# Patient Record
Sex: FEMALE | Race: WHITE | NOT HISPANIC OR LATINO | Employment: OTHER | URBAN - METROPOLITAN AREA
[De-identification: names, ages, dates, MRNs, and addresses within clinical notes are randomized per-mention and may not be internally consistent; named-entity substitution may affect disease eponyms.]

---

## 2021-09-27 ENCOUNTER — HOSPITAL ENCOUNTER (OUTPATIENT)
Dept: LAB | Facility: MEDICAL CENTER | Age: 61
End: 2021-09-27
Attending: FAMILY MEDICINE
Payer: COMMERCIAL

## 2021-09-27 LAB
25(OH)D3 SERPL-MCNC: 58 NG/ML (ref 30–100)
ALBUMIN SERPL BCP-MCNC: 4.5 G/DL (ref 3.2–4.9)
ALBUMIN/GLOB SERPL: 1.5 G/DL
ALP SERPL-CCNC: 66 U/L (ref 30–99)
ALT SERPL-CCNC: 12 U/L (ref 2–50)
ANION GAP SERPL CALC-SCNC: 10 MMOL/L (ref 7–16)
AST SERPL-CCNC: 17 U/L (ref 12–45)
BASOPHILS # BLD AUTO: 1.3 % (ref 0–1.8)
BASOPHILS # BLD: 0.06 K/UL (ref 0–0.12)
BILIRUB SERPL-MCNC: 0.4 MG/DL (ref 0.1–1.5)
BUN SERPL-MCNC: 8 MG/DL (ref 8–22)
CA-I SERPL-SCNC: 1.3 MMOL/L (ref 1.1–1.3)
CALCIUM SERPL-MCNC: 10.1 MG/DL (ref 8.5–10.5)
CHLORIDE SERPL-SCNC: 112 MMOL/L (ref 96–112)
CHOLEST SERPL-MCNC: 167 MG/DL (ref 100–199)
CO2 SERPL-SCNC: 24 MMOL/L (ref 20–33)
CREAT SERPL-MCNC: 0.64 MG/DL (ref 0.5–1.4)
CRP SERPL HS-MCNC: 0.2 MG/L (ref 0–3)
DHEA-S SERPL-MCNC: 163 UG/DL (ref 18.9–205)
EOSINOPHIL # BLD AUTO: 0.45 K/UL (ref 0–0.51)
EOSINOPHIL NFR BLD: 9.7 % (ref 0–6.9)
ERYTHROCYTE [DISTWIDTH] IN BLOOD BY AUTOMATED COUNT: 39.6 FL (ref 35.9–50)
EST. AVERAGE GLUCOSE BLD GHB EST-MCNC: 100 MG/DL
ESTRADIOL SERPL-MCNC: 17.1 PG/ML
GLOBULIN SER CALC-MCNC: 3 G/DL (ref 1.9–3.5)
GLUCOSE SERPL-MCNC: 95 MG/DL (ref 65–99)
HBA1C MFR BLD: 5.1 % (ref 4–5.6)
HCT VFR BLD AUTO: 42.6 % (ref 37–47)
HCYS SERPL-SCNC: 9.47 UMOL/L
HDLC SERPL-MCNC: 80 MG/DL
HGB BLD-MCNC: 14.6 G/DL (ref 12–16)
IMM GRANULOCYTES # BLD AUTO: 0 K/UL (ref 0–0.11)
IMM GRANULOCYTES NFR BLD AUTO: 0 % (ref 0–0.9)
LDLC SERPL CALC-MCNC: 75 MG/DL
LYMPHOCYTES # BLD AUTO: 1.25 K/UL (ref 1–4.8)
LYMPHOCYTES NFR BLD: 26.9 % (ref 22–41)
MCH RBC QN AUTO: 31.1 PG (ref 27–33)
MCHC RBC AUTO-ENTMCNC: 34.3 G/DL (ref 33.6–35)
MCV RBC AUTO: 90.8 FL (ref 81.4–97.8)
MONOCYTES # BLD AUTO: 0.29 K/UL (ref 0–0.85)
MONOCYTES NFR BLD AUTO: 6.2 % (ref 0–13.4)
NEUTROPHILS # BLD AUTO: 2.6 K/UL (ref 2–7.15)
NEUTROPHILS NFR BLD: 55.9 % (ref 44–72)
NRBC # BLD AUTO: 0 K/UL
NRBC BLD-RTO: 0 /100 WBC
PLATELET # BLD AUTO: 246 K/UL (ref 164–446)
PMV BLD AUTO: 10 FL (ref 9–12.9)
POTASSIUM SERPL-SCNC: 4.2 MMOL/L (ref 3.6–5.5)
PROGEST SERPL-MCNC: 0.85 NG/ML
PROLACTIN SERPL-MCNC: 12.1 NG/ML (ref 2.8–26)
PROT SERPL-MCNC: 7.5 G/DL (ref 6–8.2)
PTH-INTACT SERPL-MCNC: 67.4 PG/ML (ref 14–72)
RBC # BLD AUTO: 4.69 M/UL (ref 4.2–5.4)
SODIUM SERPL-SCNC: 146 MMOL/L (ref 135–145)
T3FREE SERPL-MCNC: 3.07 PG/ML (ref 2–4.4)
THYROPEROXIDASE AB SERPL-ACNC: <9 IU/ML (ref 0–9)
TRIGL SERPL-MCNC: 58 MG/DL (ref 0–149)
TSH SERPL DL<=0.005 MIU/L-ACNC: 1.29 UIU/ML (ref 0.38–5.33)
WBC # BLD AUTO: 4.7 K/UL (ref 4.8–10.8)

## 2021-09-27 PROCEDURE — 84305 ASSAY OF SOMATOMEDIN: CPT

## 2021-09-27 PROCEDURE — 84443 ASSAY THYROID STIM HORMONE: CPT

## 2021-09-27 PROCEDURE — 82679 ASSAY OF ESTRONE: CPT

## 2021-09-27 PROCEDURE — 84146 ASSAY OF PROLACTIN: CPT

## 2021-09-27 PROCEDURE — 83525 ASSAY OF INSULIN: CPT

## 2021-09-27 PROCEDURE — 84144 ASSAY OF PROGESTERONE: CPT

## 2021-09-27 PROCEDURE — 36415 COLL VENOUS BLD VENIPUNCTURE: CPT

## 2021-09-27 PROCEDURE — 86141 C-REACTIVE PROTEIN HS: CPT

## 2021-09-27 PROCEDURE — 84140 ASSAY OF PREGNENOLONE: CPT

## 2021-09-27 PROCEDURE — 80061 LIPID PANEL: CPT

## 2021-09-27 PROCEDURE — 80053 COMPREHEN METABOLIC PANEL: CPT

## 2021-09-27 PROCEDURE — 84481 FREE ASSAY (FT-3): CPT

## 2021-09-27 PROCEDURE — 84270 ASSAY OF SEX HORMONE GLOBUL: CPT

## 2021-09-27 PROCEDURE — 86376 MICROSOMAL ANTIBODY EACH: CPT

## 2021-09-27 PROCEDURE — 85025 COMPLETE CBC W/AUTO DIFF WBC: CPT

## 2021-09-27 PROCEDURE — 84403 ASSAY OF TOTAL TESTOSTERONE: CPT

## 2021-09-27 PROCEDURE — 83036 HEMOGLOBIN GLYCOSYLATED A1C: CPT

## 2021-09-27 PROCEDURE — 83090 ASSAY OF HOMOCYSTEINE: CPT

## 2021-09-27 PROCEDURE — 82670 ASSAY OF TOTAL ESTRADIOL: CPT

## 2021-09-27 PROCEDURE — 82306 VITAMIN D 25 HYDROXY: CPT

## 2021-09-27 PROCEDURE — 82627 DEHYDROEPIANDROSTERONE: CPT

## 2021-09-27 PROCEDURE — 82330 ASSAY OF CALCIUM: CPT

## 2021-09-27 PROCEDURE — 84402 ASSAY OF FREE TESTOSTERONE: CPT

## 2021-09-27 PROCEDURE — 86800 THYROGLOBULIN ANTIBODY: CPT

## 2021-09-27 PROCEDURE — 83970 ASSAY OF PARATHORMONE: CPT

## 2021-09-27 PROCEDURE — 84482 T3 REVERSE: CPT

## 2021-09-29 LAB
INSULIN P FAST SERPL-ACNC: 6 UIU/ML (ref 3–25)
THYROGLOB AB SERPL-ACNC: <0.9 IU/ML (ref 0–4)

## 2021-09-30 LAB
IGF-I SERPL-MCNC: 126 NG/ML (ref 41–243)
IGF-I Z-SCORE SERPL: 0.2
PREG SERPL-MCNC: 87 NG/DL (ref 15–132)

## 2021-10-01 LAB
ESTRONE SERPL-MCNC: 25.9 PG/ML
T3REVERSE SERPL-MCNC: 12.8 NG/DL (ref 9–27)

## 2021-10-02 LAB
SHBG SERPL-SCNC: 99 NMOL/L (ref 30–135)
TESTOST FREE SERPL-MCNC: 1.4 PG/ML (ref 0.6–3.8)
TESTOST SERPL-MCNC: 18 NG/DL (ref 5–32)

## 2021-12-13 ENCOUNTER — OFFICE VISIT (OUTPATIENT)
Dept: URGENT CARE | Facility: CLINIC | Age: 61
End: 2021-12-13
Payer: COMMERCIAL

## 2021-12-13 VITALS
OXYGEN SATURATION: 95 % | DIASTOLIC BLOOD PRESSURE: 66 MMHG | WEIGHT: 153.6 LBS | SYSTOLIC BLOOD PRESSURE: 110 MMHG | TEMPERATURE: 97.6 F | HEART RATE: 64 BPM | BODY MASS INDEX: 24.11 KG/M2 | HEIGHT: 67 IN | RESPIRATION RATE: 16 BRPM

## 2021-12-13 DIAGNOSIS — R51.9 CHRONIC NONINTRACTABLE HEADACHE, UNSPECIFIED HEADACHE TYPE: ICD-10-CM

## 2021-12-13 DIAGNOSIS — G89.29 CHRONIC NONINTRACTABLE HEADACHE, UNSPECIFIED HEADACHE TYPE: ICD-10-CM

## 2021-12-13 PROCEDURE — 99203 OFFICE O/P NEW LOW 30 MIN: CPT | Performed by: PHYSICIAN ASSISTANT

## 2021-12-13 RX ORDER — KETOROLAC TROMETHAMINE 30 MG/ML
30 INJECTION, SOLUTION INTRAMUSCULAR; INTRAVENOUS ONCE
Status: DISCONTINUED | OUTPATIENT
Start: 2021-12-13 | End: 2021-12-13

## 2021-12-13 RX ORDER — PROGESTERONE 100 MG/1
100 CAPSULE ORAL
COMMUNITY

## 2021-12-13 RX ORDER — CYCLOBENZAPRINE HCL 10 MG
10 TABLET ORAL NIGHTLY PRN
Qty: 15 TABLET | Refills: 0 | Status: SHIPPED | OUTPATIENT
Start: 2021-12-13 | End: 2022-02-15

## 2021-12-13 ASSESSMENT — FIBROSIS 4 INDEX: FIB4 SCORE: 1.22

## 2021-12-14 NOTE — PROGRESS NOTES
"Subjective:   Fela Montoya is a 61 y.o. female who presents for Headache (Headaches x 2 months. The patient went to the chiropractor and PCP, blood pressure was normal per patient. H/O sinu problems/migraines, but not like this before. Weather affects symptoms. Tx: Advil, Tylenol.)      HPI  Patient is a 61-year-old female who presents to clinic with complaints of a headache onset approximately 6 weeks ago.  The headaches are intermittent.  Sometimes they are mild and sometimes they are very painful she states.  Coughing makes the headache worse.  Headache described as a pressure.  Location to the posterior head and neck.  Denies any sinus pain/sinus headache.  She has been to the chiropractor twice without much relief.  She also presented to her PCP a couple days ago and  she states her PCP only evaluated her blood pressure and prescribed her a migraine medication.  She is unsure which medication because she has not picked it up yet.  Currently, her headache is 3-4/10 in severity. Denies any fever, chills, nasal congestion, chest pain, SOB, numbness, tingling, weakness, vision changes, photophobia, nausea, vomiting.  No recent illness        Medications:    • ALPRAZolam Tabs  • progesterone Caps  • vitamin D Tabs    Allergies: Patient has no known allergies.    Problem List: Fela Montoya does not have any pertinent problems on file.    Surgical History:  Past Surgical History:   Procedure Laterality Date   • LIPOMA EXCISION         Past Social Hx: Fela Montoya  reports that she has never smoked. She has never used smokeless tobacco. She reports current alcohol use. She reports that she does not use drugs.     Past Family Hx:  Fela Montoya family history includes Heart Disease in her father.     Problem list, medications, and allergies reviewed by myself today in Epic.     Objective:     /66   Pulse 64   Temp 36.4 °C (97.6 °F) (Temporal)   Resp 16   Ht 1.702 m (5' 7\")   Wt 69.7 kg (153 lb 9.6 " oz)   SpO2 95%   BMI 24.06 kg/m²     Physical Exam  Vitals reviewed.   Constitutional:       General: She is not in acute distress.     Appearance: Normal appearance. She is not ill-appearing or toxic-appearing.   HENT:      Head: Normocephalic.      Right Ear: Tympanic membrane normal.      Left Ear: Tympanic membrane normal.      Nose: Nose normal.      Mouth/Throat:      Mouth: Mucous membranes are moist.      Pharynx: Oropharynx is clear.   Eyes:      Extraocular Movements: Extraocular movements intact.      Conjunctiva/sclera: Conjunctivae normal.      Pupils: Pupils are equal, round, and reactive to light.   Cardiovascular:      Rate and Rhythm: Normal rate and regular rhythm.      Heart sounds: Normal heart sounds.   Pulmonary:      Effort: Pulmonary effort is normal. No respiratory distress.      Breath sounds: Normal breath sounds. No wheezing, rhonchi or rales.   Musculoskeletal:      Cervical back: Normal range of motion and neck supple. No rigidity, tenderness or crepitus. No pain with movement, spinous process tenderness or muscular tenderness. Normal range of motion.   Lymphadenopathy:      Cervical: No cervical adenopathy.   Skin:     General: Skin is warm and dry.   Neurological:      General: No focal deficit present.      Mental Status: She is alert and oriented to person, place, and time.      Cranial Nerves: Cranial nerves are intact.      Sensory: Sensation is intact.      Motor: Motor function is intact.      Coordination: Coordination is intact.      Gait: Gait is intact.   Psychiatric:         Mood and Affect: Mood normal.         Behavior: Behavior normal.         Diagnosis and associated orders:     1. Chronic nonintractable headache, unspecified headache type  Referral to Neurology    cyclobenzaprine (FLEXERIL) 10 mg Tab    DISCONTINUED: ketorolac (TORADOL) injection 30 mg        Comments/MDM:     • This is a pleasant well-appearing 61-year-old female who presents the clinic with  complaints of intermittent headaches for the past 6 weeks.  She reports a history of migraine and history of sinus headache and this does not feel similar.  See full history above.  Examination is benign.  Normal neurological examination. No meningeal signs.  I am suspicious for a tension headache.    Patient politely declined Toradol injection.  She would rather try Flexeril. Treatment of cyclobenzaprine.   • Discussed with patient this medication can cause drowsiness/sedation. The patient was instructed to use medication mostly during the evening/night time. Instructed to avoid driving, operating machinery, or drinking alcohol while using this medication. Patient verbalized understanding and agreement.   • Tylenol and/or ibuprofen.  Plenty of rest.  Plenty of fluids.  • Referral placed to neurology       I personally reviewed prior external notes and test results pertinent to today's visit. Red flags discussed and indications to present to the Emergency Department. Supportive care, natural history, differential diagnoses, and indications for immediate follow-up discussed. Patient expresses understanding and agrees to plan. Patient denies any other questions or concerns.     Follow-up with the primary care physician for recheck, reevaluation, and consideration of further management.    Time spent evaluating the patient was 30 minutes which included preparing for the visit, obtaining history, examination, ordering medications, independent interpretation, discussion of plan, counseling/education, medical information reconciliation, and documentation into chart.     Please note that this dictation was created using voice recognition software. I have made a reasonable attempt to correct obvious errors, but I expect that there are errors of grammar and possibly content that I did not discover before finalizing the note.    This note was electronically signed by Vikram Rachel PA-C

## 2022-01-09 ENCOUNTER — HOSPITAL ENCOUNTER (EMERGENCY)
Facility: MEDICAL CENTER | Age: 62
End: 2022-01-09
Attending: STUDENT IN AN ORGANIZED HEALTH CARE EDUCATION/TRAINING PROGRAM
Payer: COMMERCIAL

## 2022-01-09 ENCOUNTER — APPOINTMENT (OUTPATIENT)
Dept: RADIOLOGY | Facility: MEDICAL CENTER | Age: 62
End: 2022-01-09
Attending: STUDENT IN AN ORGANIZED HEALTH CARE EDUCATION/TRAINING PROGRAM
Payer: COMMERCIAL

## 2022-01-09 VITALS
RESPIRATION RATE: 18 BRPM | TEMPERATURE: 98 F | OXYGEN SATURATION: 95 % | HEART RATE: 81 BPM | DIASTOLIC BLOOD PRESSURE: 71 MMHG | WEIGHT: 154.54 LBS | BODY MASS INDEX: 24.26 KG/M2 | SYSTOLIC BLOOD PRESSURE: 128 MMHG | HEIGHT: 67 IN

## 2022-01-09 DIAGNOSIS — G44.209 TENSION HEADACHE: Primary | ICD-10-CM

## 2022-01-09 LAB
ANION GAP SERPL CALC-SCNC: 13 MMOL/L (ref 7–16)
BASOPHILS # BLD AUTO: 0.9 % (ref 0–1.8)
BASOPHILS # BLD: 0.06 K/UL (ref 0–0.12)
BUN SERPL-MCNC: 11 MG/DL (ref 8–22)
CALCIUM SERPL-MCNC: 10.8 MG/DL (ref 8.5–10.5)
CHLORIDE SERPL-SCNC: 107 MMOL/L (ref 96–112)
CO2 SERPL-SCNC: 24 MMOL/L (ref 20–33)
CREAT SERPL-MCNC: 0.71 MG/DL (ref 0.5–1.4)
EOSINOPHIL # BLD AUTO: 0.44 K/UL (ref 0–0.51)
EOSINOPHIL NFR BLD: 6.6 % (ref 0–6.9)
ERYTHROCYTE [DISTWIDTH] IN BLOOD BY AUTOMATED COUNT: 39.7 FL (ref 35.9–50)
GLUCOSE SERPL-MCNC: 90 MG/DL (ref 65–99)
HCT VFR BLD AUTO: 41.5 % (ref 37–47)
HGB BLD-MCNC: 14.1 G/DL (ref 12–16)
IMM GRANULOCYTES # BLD AUTO: 0.01 K/UL (ref 0–0.11)
IMM GRANULOCYTES NFR BLD AUTO: 0.2 % (ref 0–0.9)
LYMPHOCYTES # BLD AUTO: 1.76 K/UL (ref 1–4.8)
LYMPHOCYTES NFR BLD: 26.5 % (ref 22–41)
MCH RBC QN AUTO: 31.1 PG (ref 27–33)
MCHC RBC AUTO-ENTMCNC: 34 G/DL (ref 33.6–35)
MCV RBC AUTO: 91.6 FL (ref 81.4–97.8)
MONOCYTES # BLD AUTO: 0.63 K/UL (ref 0–0.85)
MONOCYTES NFR BLD AUTO: 9.5 % (ref 0–13.4)
NEUTROPHILS # BLD AUTO: 3.75 K/UL (ref 2–7.15)
NEUTROPHILS NFR BLD: 56.3 % (ref 44–72)
NRBC # BLD AUTO: 0 K/UL
NRBC BLD-RTO: 0 /100 WBC
PLATELET # BLD AUTO: 245 K/UL (ref 164–446)
PMV BLD AUTO: 9.7 FL (ref 9–12.9)
POTASSIUM SERPL-SCNC: 3.9 MMOL/L (ref 3.6–5.5)
RBC # BLD AUTO: 4.53 M/UL (ref 4.2–5.4)
SODIUM SERPL-SCNC: 144 MMOL/L (ref 135–145)
WBC # BLD AUTO: 6.7 K/UL (ref 4.8–10.8)

## 2022-01-09 PROCEDURE — 700111 HCHG RX REV CODE 636 W/ 250 OVERRIDE (IP): Performed by: STUDENT IN AN ORGANIZED HEALTH CARE EDUCATION/TRAINING PROGRAM

## 2022-01-09 PROCEDURE — 80048 BASIC METABOLIC PNL TOTAL CA: CPT

## 2022-01-09 PROCEDURE — 96374 THER/PROPH/DIAG INJ IV PUSH: CPT

## 2022-01-09 PROCEDURE — 96375 TX/PRO/DX INJ NEW DRUG ADDON: CPT

## 2022-01-09 PROCEDURE — 85025 COMPLETE CBC W/AUTO DIFF WBC: CPT

## 2022-01-09 PROCEDURE — 70496 CT ANGIOGRAPHY HEAD: CPT

## 2022-01-09 PROCEDURE — 700117 HCHG RX CONTRAST REV CODE 255: Performed by: STUDENT IN AN ORGANIZED HEALTH CARE EDUCATION/TRAINING PROGRAM

## 2022-01-09 PROCEDURE — 70498 CT ANGIOGRAPHY NECK: CPT

## 2022-01-09 PROCEDURE — 700105 HCHG RX REV CODE 258: Performed by: STUDENT IN AN ORGANIZED HEALTH CARE EDUCATION/TRAINING PROGRAM

## 2022-01-09 PROCEDURE — 99284 EMERGENCY DEPT VISIT MOD MDM: CPT

## 2022-01-09 RX ORDER — SODIUM CHLORIDE 9 MG/ML
1000 INJECTION, SOLUTION INTRAVENOUS ONCE
Status: COMPLETED | OUTPATIENT
Start: 2022-01-09 | End: 2022-01-09

## 2022-01-09 RX ORDER — PROCHLORPERAZINE EDISYLATE 5 MG/ML
10 INJECTION INTRAMUSCULAR; INTRAVENOUS ONCE
Status: COMPLETED | OUTPATIENT
Start: 2022-01-09 | End: 2022-01-09

## 2022-01-09 RX ORDER — DIPHENHYDRAMINE HYDROCHLORIDE 50 MG/ML
12.5 INJECTION INTRAMUSCULAR; INTRAVENOUS ONCE
Status: COMPLETED | OUTPATIENT
Start: 2022-01-09 | End: 2022-01-09

## 2022-01-09 RX ADMIN — DIPHENHYDRAMINE HYDROCHLORIDE 12.5 MG: 50 INJECTION INTRAMUSCULAR; INTRAVENOUS at 17:43

## 2022-01-09 RX ADMIN — PROCHLORPERAZINE EDISYLATE 10 MG: 5 INJECTION INTRAMUSCULAR; INTRAVENOUS at 17:42

## 2022-01-09 RX ADMIN — SODIUM CHLORIDE 1000 ML: 9 INJECTION, SOLUTION INTRAVENOUS at 17:42

## 2022-01-09 RX ADMIN — IOHEXOL 75 ML: 350 INJECTION, SOLUTION INTRAVENOUS at 18:56

## 2022-01-09 ASSESSMENT — ENCOUNTER SYMPTOMS
NAUSEA: 0
BLURRED VISION: 0
CHILLS: 0
DOUBLE VISION: 0
VOMITING: 0
FALLS: 0
SORE THROAT: 0
NECK PAIN: 1
ABDOMINAL PAIN: 0
FEVER: 0
HEADACHES: 1
COUGH: 0
LOSS OF CONSCIOUSNESS: 0
SHORTNESS OF BREATH: 0

## 2022-01-09 ASSESSMENT — FIBROSIS 4 INDEX: FIB4 SCORE: 1.22

## 2022-01-10 NOTE — ED TRIAGE NOTES
Chief Complaint   Patient presents with   • Headache     Pt complains of headaches x2.5 months. Pt states headache gets worse when she coughs and lingers. Pt states no neuro deficits, only pain.     Pt educated upon triage process and told to inform  staff of any changes in condition so that Pt may be reassessed. No further questions at this time. Pt sitting out in lobby.

## 2022-01-10 NOTE — ED NOTES
Discharge instructions given and explained to pt including f/u. All questions answered and pt verbalized understanding.

## 2022-01-10 NOTE — ED PROVIDER NOTES
"ED Provider Note    Chief Complaint:   Headache    HPI:  Fela Montoya is a very pleasant 56-year-old female with past medical history of anxiety and tension headaches presenting with 2 months of worsening headache which radiates from the neck up to the back of the head.  Patient reports that the pain is shooting, pressure-like but different than her normal tension headaches.  Patient denies vision changes, nausea vomiting, unilateral weakness or numbness.  Patient denies fevers or chills.    Review of Systems:  Review of Systems   Constitutional: Negative for chills and fever.   HENT: Negative for congestion and sore throat.    Eyes: Negative for blurred vision and double vision.   Respiratory: Negative for cough and shortness of breath.    Cardiovascular: Negative for chest pain and leg swelling.   Gastrointestinal: Negative for abdominal pain, nausea and vomiting.   Genitourinary: Negative for dysuria and hematuria.   Musculoskeletal: Positive for neck pain. Negative for falls.   Skin: Negative for itching and rash.   Neurological: Positive for headaches. Negative for loss of consciousness.       Past Medical History:   has a past medical history of Anxiety.    Social History:  Social History     Tobacco Use   • Smoking status: Never Smoker   • Smokeless tobacco: Never Used   Substance and Sexual Activity   • Alcohol use: Yes     Comment: rare   • Drug use: No   • Sexual activity: Yes     Partners: Male     Birth control/protection: Post-Menopausal, Surgical     Comment:  has had a vasectomy       Surgical History:   has a past surgical history that includes lipoma excision.    Current Medications:  Home Medications    **Home medications have not yet been reviewed for this encounter**         Allergies:  No Known Allergies    Physical Exam:  Vital Signs: /71   Pulse 81   Temp 36.7 °C (98 °F) (Temporal)   Resp 18   Ht 1.702 m (5' 7\")   Wt 70.1 kg (154 lb 8.7 oz)   SpO2 95%   BMI 24.20 kg/m² "   Physical Exam  Vitals and nursing note reviewed.   Constitutional:       Comments: Patient is lying in bed supine, pleasant, conversant, speaking in complete sentences   HENT:      Head: Normocephalic and atraumatic.   Eyes:      Extraocular Movements: Extraocular movements intact.      Conjunctiva/sclera: Conjunctivae normal.      Pupils: Pupils are equal, round, and reactive to light.   Cardiovascular:      Pulses: Normal pulses.      Comments: HR 82  Pulmonary:      Effort: Pulmonary effort is normal. No respiratory distress.   Abdominal:      Comments: Abdomen is soft, non-tender, non-distended, non-rigid, no rebound, guarding, masses, no McBurney's point tenderness, no peritoneal signs, negative Rovsing sign, negative Wills sign.  No CVA tenderness to palpation. Benign abdomen.   Musculoskeletal:         General: No swelling. Normal range of motion.      Cervical back: Normal range of motion. No rigidity.   Skin:     General: Skin is warm and dry.      Capillary Refill: Capillary refill takes less than 2 seconds.   Neurological:      Mental Status: She is alert.      Comments: CN II-XII intact, speech normal, motor strength 5/5 in all four extremities, normal  strength bilaterally, sensation to light touch grossly intact in all extremities, no finger to nose dysmetria, no pronator drift, no nystagmus, AO x3           Medical records reviewed for continuity of care.     Results for orders placed or performed during the hospital encounter of 01/09/22   CBC WITH DIFFERENTIAL   Result Value Ref Range    WBC 6.7 4.8 - 10.8 K/uL    RBC 4.53 4.20 - 5.40 M/uL    Hemoglobin 14.1 12.0 - 16.0 g/dL    Hematocrit 41.5 37.0 - 47.0 %    MCV 91.6 81.4 - 97.8 fL    MCH 31.1 27.0 - 33.0 pg    MCHC 34.0 33.6 - 35.0 g/dL    RDW 39.7 35.9 - 50.0 fL    Platelet Count 245 164 - 446 K/uL    MPV 9.7 9.0 - 12.9 fL    Neutrophils-Polys 56.30 44.00 - 72.00 %    Lymphocytes 26.50 22.00 - 41.00 %    Monocytes 9.50 0.00 - 13.40 %     Eosinophils 6.60 0.00 - 6.90 %    Basophils 0.90 0.00 - 1.80 %    Immature Granulocytes 0.20 0.00 - 0.90 %    Nucleated RBC 0.00 /100 WBC    Neutrophils (Absolute) 3.75 2.00 - 7.15 K/uL    Lymphs (Absolute) 1.76 1.00 - 4.80 K/uL    Monos (Absolute) 0.63 0.00 - 0.85 K/uL    Eos (Absolute) 0.44 0.00 - 0.51 K/uL    Baso (Absolute) 0.06 0.00 - 0.12 K/uL    Immature Granulocytes (abs) 0.01 0.00 - 0.11 K/uL    NRBC (Absolute) 0.00 K/uL   Basic Metabolic Panel   Result Value Ref Range    Sodium 144 135 - 145 mmol/L    Potassium 3.9 3.6 - 5.5 mmol/L    Chloride 107 96 - 112 mmol/L    Co2 24 20 - 33 mmol/L    Glucose 90 65 - 99 mg/dL    Bun 11 8 - 22 mg/dL    Creatinine 0.71 0.50 - 1.40 mg/dL    Calcium 10.8 (H) 8.5 - 10.5 mg/dL    Anion Gap 13.0 7.0 - 16.0   ESTIMATED GFR   Result Value Ref Range    GFR If African American >60 >60 mL/min/1.73 m 2    GFR If Non African American >60 >60 mL/min/1.73 m 2       Radiology:  CT-CTA HEAD WITH & W/O-POST PROCESS   Final Result      CT angiogram of the [place] demonstrates no large vessel occlusion, aneurysm or dissection      Basilar artery short segment fenestration, normal variant      CT-CTA NECK WITH & W/O-POST PROCESSING   Final Result      CT angiogram of the neck within normal limits.           ED Medications Administered:  Medications   NS infusion 1,000 mL (1,000 mL Intravenous New Bag 1/9/22 1742)   diphenhydrAMINE (BENADRYL) injection 12.5 mg (12.5 mg Intravenous Given 1/9/22 1743)   prochlorperazine (COMPAZINE) injection 10 mg (10 mg Intravenous Given 1/9/22 1742)   iohexol (OMNIPAQUE) 350 mg/mL (IV) (75 mL Intravenous Given 1/9/22 8026)       MDM:  CTA head neck to evaluate for intracranial hemorrhage, intracranial mass, dissection, aneurysm.  IV medication including fluids, Compazine, Benadryl for symptom control.  No evidence of neurologic deficit at this time, acute CVA is inconsistent with patient presentation at this time.  Also within the differential or  migraine headache, tension headache, URI.  Disposition pending work-up.    Electronically signed by: Greyson Mckeon M.D., 1/9/2022, 5:11 PM    CMP demonstrates no evidence of acute kidney injury, acute electrolyte abnormality, acute liver failure, CBC demonstrates no evidence of acute anemia or leukocytosis.  CTA head and neck demonstrate no signs of acute intracranial abnormality, dissection, aneurysm.  Patient reports she is pain-free following IV medication.  Patient has follow-up with neurology and she has been instructed to follow-up with PCP.    Repeat physical exam benign.  I doubt any serious emergency process at this time.  Patient and/or family, friends given strict return precautions and care instructions. They have demonstrated understanding of discharge instructions through teach back mechanism. Advised PCP follow-up in 1-2 days.  Patient/family/friend expresses understanding and agrees to plan.    This dictation has been created using voice recognition software. I am continuously working with the software to minimize the number of voice recognition errors and I have made every attempt to manually correct the errors within my dictation. However errors  related to this voice recognition software may still exist and should be interpreted within the appropriate context.     Electronically signed by: Greyson Mckeon M.D., 1/9/2022 7:23 PM      Disposition:  Home    Final Impression:  1. Tension headache

## 2022-02-15 ENCOUNTER — OFFICE VISIT (OUTPATIENT)
Dept: NEUROLOGY | Facility: MEDICAL CENTER | Age: 62
End: 2022-02-15
Attending: NURSE PRACTITIONER
Payer: COMMERCIAL

## 2022-02-15 VITALS
OXYGEN SATURATION: 97 % | RESPIRATION RATE: 12 BRPM | TEMPERATURE: 97.2 F | SYSTOLIC BLOOD PRESSURE: 112 MMHG | BODY MASS INDEX: 24.71 KG/M2 | HEIGHT: 67 IN | DIASTOLIC BLOOD PRESSURE: 70 MMHG | WEIGHT: 157.41 LBS | HEART RATE: 69 BPM

## 2022-02-15 DIAGNOSIS — G44.89 OTHER HEADACHE SYNDROME: ICD-10-CM

## 2022-02-15 PROCEDURE — 99204 OFFICE O/P NEW MOD 45 MIN: CPT | Performed by: NURSE PRACTITIONER

## 2022-02-15 PROCEDURE — 99212 OFFICE O/P EST SF 10 MIN: CPT | Performed by: NURSE PRACTITIONER

## 2022-02-15 RX ORDER — KETOROLAC TROMETHAMINE 10 MG/1
TABLET, FILM COATED ORAL
Qty: 20 TABLET | Refills: 3 | Status: SHIPPED | OUTPATIENT
Start: 2022-02-15 | End: 2022-03-27

## 2022-02-15 ASSESSMENT — ENCOUNTER SYMPTOMS
VOMITING: 0
SINUS PAIN: 0
HEARTBURN: 0
SENSORY CHANGE: 0
BACK PAIN: 0
NECK PAIN: 0
NAUSEA: 0
BLURRED VISION: 0
DEPRESSION: 0
SPEECH CHANGE: 0
HEADACHES: 1
COUGH: 1
NERVOUS/ANXIOUS: 1
FOCAL WEAKNESS: 0
FEVER: 0
WEAKNESS: 0
DIZZINESS: 0
TREMORS: 0

## 2022-02-15 ASSESSMENT — FIBROSIS 4 INDEX: FIB4 SCORE: 1.22

## 2022-02-15 ASSESSMENT — PATIENT HEALTH QUESTIONNAIRE - PHQ9
CLINICAL INTERPRETATION OF PHQ2 SCORE: 1
5. POOR APPETITE OR OVEREATING: 0 - NOT AT ALL
SUM OF ALL RESPONSES TO PHQ QUESTIONS 1-9: 2

## 2022-02-15 NOTE — PATIENT INSTRUCTIONS
If you do not hear from radiology by next week to schedule MRI, call [phone] to schedule.          I recommend the following over the counter supplements every night at bedtime:  Start magnesium oxide 400mg gel cap by mouth every night, may take extra dose if needed for headache (over the counter), hold for diarrhea         Start Riboflavin (Vitamin B2) 400mg by mouth every night (over the counter),may turn urine bright yellow         Start COQ 10, take 300mg every night. (over the counter)          Attempt to go to bed and get up at the same time every night           Eat meals on regular basis            Stay hydrated.             Aerobic exercise 30 minutes daily             Avoid aged or smoked foods, avoid processed foods, red wine, aged cheese              Keep headache diary, include foods that you may have eaten.             Avoid overusing over the counter medications:  Do not take more than 500mg acetaminophen (tylenol), more than 4 times weekly, more frequent or larger doses are associated with medication overuse headache.              Migraine Headache  A migraine headache is a very strong throbbing pain on one side or both sides of your head. This type of headache can also cause other symptoms. It can last from 4 hours to 3 days. Talk with your doctor about what things may bring on (trigger) this condition.  What are the causes?  The exact cause of this condition is not known. This condition may be triggered or caused by:  · Drinking alcohol.  · Smoking.  · Taking medicines, such as:  ? Medicine used to treat chest pain (nitroglycerin).  ? Birth control pills.  ? Estrogen.  ? Some blood pressure medicines.  · Eating or drinking certain products.  · Doing physical activity.  Other things that may trigger a migraine headache include:  · Having a menstrual period.  · Pregnancy.  · Hunger.  · Stress.  · Not getting enough sleep or getting too much sleep.  · Weather changes.  · Tiredness  (fatigue).  What increases the risk?  · Being 25-55 years old.  · Being female.  · Having a family history of migraine headaches.  · Being .  · Having depression or anxiety.  · Being very overweight.  What are the signs or symptoms?  · A throbbing pain. This pain may:  ? Happen in any area of the head, such as on one side or both sides.  ? Make it hard to do daily activities.  ? Get worse with physical activity.  ? Get worse around bright lights or loud noises.  · Other symptoms may include:  ? Feeling sick to your stomach (nauseous).  ? Vomiting.  ? Dizziness.  ? Being sensitive to bright lights, loud noises, or smells.  · Before you get a migraine headache, you may get warning signs (an aura). An aura may include:  ? Seeing flashing lights or having blind spots.  ? Seeing bright spots, halos, or zigzag lines.  ? Having tunnel vision or blurred vision.  ? Having numbness or a tingling feeling.  ? Having trouble talking.  ? Having weak muscles.  · Some people have symptoms after a migraine headache (postdromal phase), such as:  ? Tiredness.  ? Trouble thinking (concentrating).  How is this treated?  · Taking medicines that:  ? Relieve pain.  ? Relieve the feeling of being sick to your stomach.  ? Prevent migraine headaches.  · Treatment may also include:  ? Having acupuncture.  ? Avoiding foods that bring on migraine headaches.  ? Learning ways to control your body functions (biofeedback).  ? Therapy to help you know and deal with negative thoughts (cognitive behavioral therapy).  Follow these instructions at home:  Medicines  · Take over-the-counter and prescription medicines only as told by your doctor.  · Ask your doctor if the medicine prescribed to you:  ? Requires you to avoid driving or using heavy machinery.  ? Can cause trouble pooping (constipation). You may need to take these steps to prevent or treat trouble pooping:  § Drink enough fluid to keep your pee (urine) pale yellow.  § Take  over-the-counter or prescription medicines.  § Eat foods that are high in fiber. These include beans, whole grains, and fresh fruits and vegetables.  § Limit foods that are high in fat and sugar. These include fried or sweet foods.  Lifestyle  · Do not drink alcohol.  · Do not use any products that contain nicotine or tobacco, such as cigarettes, e-cigarettes, and chewing tobacco. If you need help quitting, ask your doctor.  · Get at least 8 hours of sleep every night.  · Limit and deal with stress.  General instructions         · Keep a journal to find out what may bring on your migraine headaches. For example, write down:  ? What you eat and drink.  ? How much sleep you get.  ? Any change in what you eat or drink.  ? Any change in your medicines.  · If you have a migraine headache:  ? Avoid things that make your symptoms worse, such as bright lights.  ? It may help to lie down in a dark, quiet room.  ? Do not drive or use heavy machinery.  ? Ask your doctor what activities are safe for you.  · Keep all follow-up visits as told by your doctor. This is important.  Contact a doctor if:  · You get a migraine headache that is different or worse than others you have had.  · You have more than 15 headache days in one month.  Get help right away if:  · Your migraine headache gets very bad.  · Your migraine headache lasts longer than 72 hours.  · You have a fever.  · You have a stiff neck.  · You have trouble seeing.  · Your muscles feel weak or like you cannot control them.  · You start to lose your balance a lot.  · You start to have trouble walking.  · You pass out (faint).  · You have a seizure.  Summary  · A migraine headache is a very strong throbbing pain on one side or both sides of your head. These headaches can also cause other symptoms.  · This condition may be treated with medicines and changes to your lifestyle.  · Keep a journal to find out what may bring on your migraine headaches.  · Contact a doctor if you  get a migraine headache that is different or worse than others you have had.  · Contact your doctor if you have more than 15 headache days in a month.  This information is not intended to replace advice given to you by your health care provider. Make sure you discuss any questions you have with your health care provider.  Document Released: 09/26/2009 Document Revised: 04/10/2020 Document Reviewed: 01/30/2020  [employer] Patient Education © 2020 [employer]

## 2022-02-15 NOTE — PROGRESS NOTES
Subjective         HPI  Fela Montoya is a 61 y.o. female who presents with headache.    She was referred by Vikram Rachel PA-C.    She has had sinus headaches and migraines her whole life. She is having head pain in the back of her head, it is worse with coughing.  This is a new headache for her.  She tried going to a chiropractor, it did not help. Bending over also feels like it causes pressure in her head. The pain is in the center occipital protuberance.  No nausea, vomiting, light or sound sensitivity, no vision changes, numbness, weakness or tingling. No neck pain, back pain or recent fevers. The head pain worsens during the day. Denies head trauma.   She rarely takes tylenol or Advil  The pain can be as high as  10.     PMH:   Anxiety, mild asthma   Social:  Denies alcohol, tobacco or drug use.  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    Family Hx:  Negative for migraine or cancer.       Review of Systems   Constitutional: Negative for fever.   HENT: Negative for congestion, ear pain and sinus pain.    Eyes: Negative for blurred vision.   Respiratory: Positive for cough.    Cardiovascular: Negative for chest pain.   Gastrointestinal: Negative for heartburn, nausea and vomiting.   Musculoskeletal: Negative for back pain and neck pain.   Skin: Negative for rash.   Neurological: Positive for headaches. Negative for dizziness, tremors, sensory change, speech change, focal weakness and weakness.   Endo/Heme/Allergies: Positive for environmental allergies.   Psychiatric/Behavioral: Negative for depression. The patient is nervous/anxious.      Past Medical History:   Diagnosis Date   • Anxiety      Current Outpatient Medications on File Prior to Visit   Medication Sig Dispense Refill   • progesterone (PROMETRIUM) 100 MG Cap Take 100 mg by mouth at bedtime.     • vitamin D3, cholecalciferol, 1000 UNIT Tab Take 2 Tabs by mouth every day. (Patient taking differently: Take 2,000 Units by mouth every 48 hours.) 100 Tab  "3   • alprazolam (XANAX) 0.25 MG Tab Take 1 Tab by mouth 3 times a day as needed for Anxiety. 30 Tab 3     No current facility-administered medications on file prior to visit.           Objective       I personally reviewed imaging below and agree with findings.      CT/CTA head 1/9/20202  CT angiogram of the [place] demonstrates no large vessel occlusion, aneurysm or dissection     Basilar artery short segment fenestration, normal variant      CTA neck 1/9/20202     CT angiogram of the neck within normal limits.    Encounter Vitals  Standard Vitals  Vitals  Blood Pressure: 112/70  Temperature: 36.2 °C (97.2 °F)  Temp src: Temporal  Pulse: 69  Respiration: 12  Pulse Oximetry: 97 %  Height: 170.2 cm (5' 7\")  Weight: 71.4 kg (157 lb 6.5 oz)  Encounter Vitals  Temperature: 36.2 °C (97.2 °F)  Temp src: Temporal  Blood Pressure: 112/70  Pulse: 69  Respiration: 12  Pulse Oximetry: 97 %  Weight: 71.4 kg (157 lb 6.5 oz)  Height: 170.2 cm (5' 7\")  BMI (Calculated): 24.65      PHYSICAL ASSESSMENT  Constitutional:  Alert, no apparent distress,  Psych:   mood and affect WNL  Muskuloskeletal:  Moves all extremities equally, strength 5/5 bilaterally, no drift  NEUROLOGICAL ASSESSMENT  Oriented X 4, speech fluent, naming and memory intact  CN II: Visual fields are full to confrontation. Fundoscopic exam is normal with sharp discs and no vascular changes. Pupils are 3  mm and briskly reactive to light.   CN III: IV, VI  EOMs intact, no ptosis  CN V: Facial sensation is intact to pinprick in all 3 divisions bilaterally. Corneal responses are intact.  CN VII: Face is symmetric with normal eye closure and smile.  CN VIII Hearing is normal to rubbing fingers  CN IX, X: Palate elevates symmetrically. Phonation is normal.  CN XI: Head turning and shoulder shrug are intact  CN XII: Tongue is midline with normal movements and no atrophy.                           Sensation to PP equal bilaterally                 No limb ataxia " with finger to nose and heel to shin                 Ambulates with steady gait.                 Rhomberg negative                Biceps,brachioradialis, tricep, and patellar reflexex all 2+     Cardiovascular:    S1S2, no abnormal rhythm auscultated, no peripheral edema  Neck:                     No carotid bruits noted   Pulmonary:            Respirations easy, lungs clear to auscultation all fields.     Skin:                     No obvious rashes.        Assessment & Plan        1. Other headache syndrome, CTA negative for aneurysm, she will need a MRI     New headache worse with coughing in patient over the age of 50, --MRI.   Considering her headache gets worse with coughing, we will also get a MRI of the C-spine.     Toradol 10mg 1 by mouth up to once daily as needed for pain, no more than 4 tablets per week.         I recommend the following over the counter supplements every night at bedtime:  Start magnesium oxide 400mg by mouth every night, may take extra dose if needed for headache (over the counter), hold for diarrhea         Start Riboflavin (Vitamin B2) 400mg by mouth every night (over the counter),may turn urine bright yellow         Start COQ 10, take 300mg every night. (over the counter)          Attempt to go to bed and get up at the same time every night           Eat meals on regular basis            Stay hydrated.             Aerobic exercise 30 minutes daily             Avoid aged or smoked foods, avoid processed foods, red wine, aged cheese              Keep headache diary, include foods that you may have eaten.             Avoid overusing over the counter medications:  Do not take more than 500mg acetaminophen (tylenol), more than 4 times weekly, more frequent or larger doses are associated with medication overuse headache.       I spent 47  minutes caring for patient, my time includes reviewing the chart, obtaining current HPI, exam, discussion of disease process, ordering testing and  medications and counseling.      I counseled patient on headache  triggers, lifestyle changes, medication overuse, supplements and medication side effects.

## 2022-02-22 ENCOUNTER — PATIENT MESSAGE (OUTPATIENT)
Dept: NEUROLOGY | Facility: MEDICAL CENTER | Age: 62
End: 2022-02-22
Payer: COMMERCIAL

## 2022-02-28 ENCOUNTER — APPOINTMENT (OUTPATIENT)
Dept: RADIOLOGY | Facility: MEDICAL CENTER | Age: 62
End: 2022-02-28
Attending: NURSE PRACTITIONER
Payer: COMMERCIAL

## 2022-02-28 DIAGNOSIS — G44.89 OTHER HEADACHE SYNDROME: ICD-10-CM

## 2022-02-28 PROCEDURE — 72141 MRI NECK SPINE W/O DYE: CPT

## 2022-02-28 PROCEDURE — 70551 MRI BRAIN STEM W/O DYE: CPT

## 2022-03-27 ENCOUNTER — OFFICE VISIT (OUTPATIENT)
Dept: URGENT CARE | Facility: CLINIC | Age: 62
End: 2022-03-27
Payer: COMMERCIAL

## 2022-03-27 ENCOUNTER — HOSPITAL ENCOUNTER (OUTPATIENT)
Facility: MEDICAL CENTER | Age: 62
End: 2022-03-27
Attending: FAMILY MEDICINE
Payer: COMMERCIAL

## 2022-03-27 VITALS
TEMPERATURE: 97.7 F | HEART RATE: 82 BPM | RESPIRATION RATE: 14 BRPM | BODY MASS INDEX: 23.54 KG/M2 | WEIGHT: 150 LBS | DIASTOLIC BLOOD PRESSURE: 76 MMHG | OXYGEN SATURATION: 99 % | HEIGHT: 67 IN | SYSTOLIC BLOOD PRESSURE: 118 MMHG

## 2022-03-27 DIAGNOSIS — Z03.818 ENCOUNTER FOR PATIENT CONCERN ABOUT EXPOSURE TO INFECTIOUS ORGANISM: ICD-10-CM

## 2022-03-27 LAB
COVID ORDER STATUS COVID19: NORMAL
SARS-COV-2 RNA RESP QL NAA+PROBE: NOTDETECTED
SPECIMEN SOURCE: NORMAL

## 2022-03-27 PROCEDURE — 99213 OFFICE O/P EST LOW 20 MIN: CPT | Mod: CS | Performed by: FAMILY MEDICINE

## 2022-03-27 PROCEDURE — U0005 INFEC AGEN DETEC AMPLI PROBE: HCPCS

## 2022-03-27 PROCEDURE — U0003 INFECTIOUS AGENT DETECTION BY NUCLEIC ACID (DNA OR RNA); SEVERE ACUTE RESPIRATORY SYNDROME CORONAVIRUS 2 (SARS-COV-2) (CORONAVIRUS DISEASE [COVID-19]), AMPLIFIED PROBE TECHNIQUE, MAKING USE OF HIGH THROUGHPUT TECHNOLOGIES AS DESCRIBED BY CMS-2020-01-R: HCPCS

## 2022-03-27 ASSESSMENT — FIBROSIS 4 INDEX: FIB4 SCORE: 1.24

## 2022-03-27 NOTE — PROGRESS NOTES
"  Subjective:      62 y.o. female presents to urgent care for cold symptoms that started over a week ago.  She is experiencing sore throat, cough, fever, and body aches. No headaches or diarrhea. She has been using Afrin, Mucinex, and [place] plus which has been helping with her symptoms. She denies any tobacco product use.  She has asthma, for which she takes Albuterol as needed.  She is not vaccinated against COVID.  She has had no known sick contacts.    She denies any other questions or concerns at this time.    Current problem list, medication, and past medical/surgical history were reviewed in Epic.    ROS  See HPI     Objective:      /76 (BP Location: Left arm, Patient Position: Sitting, BP Cuff Size: Adult)   Pulse 82   Temp 36.5 °C (97.7 °F) (Temporal)   Resp 14   Ht 1.702 m (5' 7\")   Wt 68 kg (150 lb)   SpO2 99%   BMI 23.49 kg/m²     Physical Exam  Constitutional:       General: She is not in acute distress.     Appearance: She is not diaphoretic.   HENT:      Right Ear: Tympanic membrane, ear canal and external ear normal.      Left Ear: Tympanic membrane, ear canal and external ear normal.      Nose:      Right Sinus: No maxillary sinus tenderness or frontal sinus tenderness.      Left Sinus: No maxillary sinus tenderness or frontal sinus tenderness.      Mouth/Throat:      Pharynx: Posterior oropharyngeal erythema present.      Tonsils: No tonsillar exudate. 1+ on the right. 1+ on the left.   Cardiovascular:      Rate and Rhythm: Normal rate and regular rhythm.      Heart sounds: Normal heart sounds.   Pulmonary:      Effort: Pulmonary effort is normal. No respiratory distress.      Breath sounds: Normal breath sounds.   Neurological:      Mental Status: She is alert.   Psychiatric:         Mood and Affect: Affect normal.         Judgment: Judgment normal.       Assessment/Plan:     1. Encounter for patient concern about exposure to infectious organism  Testing performed for COVID-19. " In the meantime patient was advised to isolate until COVID test results returned. I encouraged mask use, frequent handwashing, wiping down hard surfaces, etc. Tylenol and Ibuprofen were recommended for symptomatic relief. If positive they will be contacted by their local health department regarding return to work/school protocols. Patient is currently without indication of need for higher level of care. Patient/Guardian was given precautionary signs/symptoms that mandate immediate follow up and evaluation in the ED. The patient and/or guardian demonstrated a good understanding and agreed with the treatment plan.  - SARS-CoV-2 PCR (24 hour In-House): Collect NP swab in VTM; Future      Instructed to return to Urgent Care or nearest Emergency Department if symptoms fail to improve, for any change in condition, further concerns, or new concerning symptoms. Patient states understanding of the plan of care and discharge instructions.    Jasmin Villafana M.D.

## 2022-03-28 ENCOUNTER — OFFICE VISIT (OUTPATIENT)
Dept: URGENT CARE | Facility: CLINIC | Age: 62
End: 2022-03-28
Payer: COMMERCIAL

## 2022-03-28 ENCOUNTER — APPOINTMENT (OUTPATIENT)
Dept: RADIOLOGY | Facility: IMAGING CENTER | Age: 62
End: 2022-03-28
Attending: PHYSICIAN ASSISTANT
Payer: COMMERCIAL

## 2022-03-28 VITALS
RESPIRATION RATE: 18 BRPM | TEMPERATURE: 98.5 F | WEIGHT: 151.2 LBS | HEART RATE: 84 BPM | DIASTOLIC BLOOD PRESSURE: 78 MMHG | BODY MASS INDEX: 23.73 KG/M2 | OXYGEN SATURATION: 93 % | SYSTOLIC BLOOD PRESSURE: 112 MMHG | HEIGHT: 67 IN

## 2022-03-28 DIAGNOSIS — J01.00 ACUTE NON-RECURRENT MAXILLARY SINUSITIS: ICD-10-CM

## 2022-03-28 DIAGNOSIS — R05.8 PRODUCTIVE COUGH: ICD-10-CM

## 2022-03-28 PROCEDURE — 71046 X-RAY EXAM CHEST 2 VIEWS: CPT | Mod: TC | Performed by: RADIOLOGY

## 2022-03-28 PROCEDURE — 99214 OFFICE O/P EST MOD 30 MIN: CPT | Performed by: PHYSICIAN ASSISTANT

## 2022-03-28 RX ORDER — AMOXICILLIN AND CLAVULANATE POTASSIUM 875; 125 MG/1; MG/1
1 TABLET, FILM COATED ORAL 2 TIMES DAILY
Qty: 14 TABLET | Refills: 0 | Status: SHIPPED | OUTPATIENT
Start: 2022-03-28 | End: 2022-04-04

## 2022-03-28 RX ORDER — DEXTROMETHORPHAN HYDROBROMIDE AND PROMETHAZINE HYDROCHLORIDE 15; 6.25 MG/5ML; MG/5ML
5 SYRUP ORAL EVERY 6 HOURS PRN
Qty: 100 ML | Refills: 0 | Status: SHIPPED | OUTPATIENT
Start: 2022-03-28 | End: 2022-04-02

## 2022-03-28 RX ORDER — BENZONATATE 100 MG/1
100 CAPSULE ORAL 3 TIMES DAILY PRN
Qty: 30 CAPSULE | Refills: 0 | Status: SHIPPED | OUTPATIENT
Start: 2022-03-28

## 2022-03-28 ASSESSMENT — ENCOUNTER SYMPTOMS
CHILLS: 0
HEMOPTYSIS: 0
WHEEZING: 0
FEVER: 0
SPUTUM PRODUCTION: 1
COUGH: 1
SHORTNESS OF BREATH: 0
PALPITATIONS: 0
SINUS PAIN: 1

## 2022-03-28 ASSESSMENT — FIBROSIS 4 INDEX: FIB4 SCORE: 1.24

## 2022-03-28 NOTE — PROGRESS NOTES
Subjective:   Fela Montoya is a 62 y.o. female who presents today with   Chief Complaint   Patient presents with   • Cough     X 1 week having cough, nasal congestion, heavy mucus        Cough  This is a new problem. The current episode started in the past 7 days. The problem has been unchanged. The problem occurs every few minutes. The cough is productive of sputum and productive of purulent sputum. Pertinent negatives include no chest pain, chills, fever, hemoptysis, shortness of breath or wheezing. Treatments tried: OTC cough/cold, inhaler. The treatment provided mild relief.     I personally donned proper PPE throughout visit today.   Patient had negative Covid test yesterday.  Cough has been persistent and slightly worse since she was seen yesterday.  PMH:  has a past medical history of Anxiety.  MEDS:   Current Outpatient Medications:   •  promethazine-dextromethorphan (PROMETHAZINE-DM) 6.25-15 MG/5ML syrup, Take 5 mL by mouth every 6 hours as needed for Cough for up to 5 days., Disp: 100 mL, Rfl: 0  •  benzonatate (TESSALON) 100 MG Cap, Take 1 Capsule by mouth 3 times a day as needed., Disp: 30 Capsule, Rfl: 0  •  amoxicillin-clavulanate (AUGMENTIN) 875-125 MG Tab, Take 1 Tablet by mouth 2 times a day for 7 days., Disp: 14 Tablet, Rfl: 0  •  guaiFENesin (MUCINEX PO), Take  by mouth., Disp: , Rfl:   •  Riboflavin 400 MG Cap, Take 1 Capsule by mouth at bedtime., Disp: 90 Capsule, Rfl: 3  •  Coenzyme Q10 400 MG Cap, Take 1 Capsule by mouth at bedtime., Disp: 30 Capsule, Rfl: 11  •  progesterone (PROMETRIUM) 100 MG Cap, Take 100 mg by mouth at bedtime., Disp: , Rfl:   •  vitamin D3, cholecalciferol, 1000 UNIT Tab, Take 2 Tabs by mouth every day. (Patient taking differently: Take 2,000 Units by mouth every 48 hours.), Disp: 100 Tab, Rfl: 3  •  alprazolam (XANAX) 0.25 MG Tab, Take 1 Tab by mouth 3 times a day as needed for Anxiety., Disp: 30 Tab, Rfl: 3  ALLERGIES:   Allergies   Allergen Reactions   • Latex  "Rash     SURGHX:   Past Surgical History:   Procedure Laterality Date   • LIPOMA EXCISION       SOCHX:  reports that she has never smoked. She has never used smokeless tobacco. She reports current alcohol use. She reports that she does not use drugs.  FH: Reviewed with patient, not pertinent to this visit.       Review of Systems   Constitutional: Negative for chills and fever.   HENT: Positive for congestion and sinus pain.    Respiratory: Positive for cough and sputum production. Negative for hemoptysis, shortness of breath and wheezing.    Cardiovascular: Negative for chest pain and palpitations.        Objective:   /78 (BP Location: Left arm, Patient Position: Sitting, BP Cuff Size: Adult)   Pulse 84   Temp 36.9 °C (98.5 °F) (Temporal)   Resp 18   Ht 1.702 m (5' 7\")   Wt 68.6 kg (151 lb 3.2 oz)   SpO2 93%   BMI 23.68 kg/m²   Physical Exam  Vitals and nursing note reviewed.   Constitutional:       General: She is not in acute distress.     Appearance: Normal appearance. She is well-developed. She is not ill-appearing or toxic-appearing.   HENT:      Head: Normocephalic and atraumatic.      Right Ear: Hearing normal.      Left Ear: Hearing normal.      Nose: Mucosal edema and congestion present.      Right Sinus: Maxillary sinus tenderness present.      Left Sinus: Maxillary sinus tenderness present.   Cardiovascular:      Rate and Rhythm: Normal rate and regular rhythm.      Heart sounds: Normal heart sounds.   Pulmonary:      Effort: Pulmonary effort is normal.      Breath sounds: Normal breath sounds. No stridor. No wheezing, rhonchi or rales.      Comments: Congested cough on exam  Musculoskeletal:      Comments: Normal movement in all 4 extremities   Skin:     General: Skin is warm and dry.   Neurological:      Mental Status: She is alert.      Coordination: Coordination normal.   Psychiatric:         Mood and Affect: Mood normal.         DX CHEST  FINDINGS:  The soft tissues and bony structures " are unremarkable.     The heart and mediastinal structures are within normal limits.     Pulmonary vascularity is normal.     The lung fields are clear.     There is no effusion or pneumothorax.     IMPRESSION:     No acute cardiopulmonary disease evident.  Assessment/Plan:   Assessment    1. Productive cough  - DX-CHEST-2 VIEWS; Future  - promethazine-dextromethorphan (PROMETHAZINE-DM) 6.25-15 MG/5ML syrup; Take 5 mL by mouth every 6 hours as needed for Cough for up to 5 days.  Dispense: 100 mL; Refill: 0  - benzonatate (TESSALON) 100 MG Cap; Take 1 Capsule by mouth 3 times a day as needed.  Dispense: 30 Capsule; Refill: 0  - amoxicillin-clavulanate (AUGMENTIN) 875-125 MG Tab; Take 1 Tablet by mouth 2 times a day for 7 days.  Dispense: 14 Tablet; Refill: 0    2. Acute non-recurrent maxillary sinusitis  - amoxicillin-clavulanate (AUGMENTIN) 875-125 MG Tab; Take 1 Tablet by mouth 2 times a day for 7 days.  Dispense: 14 Tablet; Refill: 0  Symptoms and presentation are consistent with sinus infection and respiratory tract infection that would require antibiotics at this time.  Discussed with patient possible side effects of cough syrup at this time and she is fully understanding and agreeable with plan and will not take it before driving or working.  And will not use it at the same time as her Xanax which she states she rarely uses.  Differential diagnosis, natural history, supportive care, and indications for immediate follow-up discussed.   Patient given instructions and understanding of medications and treatment.    If not improving in 3-5 days, F/U with PCP or return to  if symptoms worsen.    Patient agreeable to plan.      Please note that this dictation was created using voice recognition software. I have made every reasonable attempt to correct obvious errors, but I expect that there are errors of grammar and possibly content that I did not discover before finalizing the note.    Jono Cerrato PA-C

## 2022-06-08 ENCOUNTER — HOSPITAL ENCOUNTER (OUTPATIENT)
Dept: RADIOLOGY | Facility: MEDICAL CENTER | Age: 62
End: 2022-06-08
Attending: INTERNAL MEDICINE
Payer: COMMERCIAL

## 2022-06-08 DIAGNOSIS — R05.3 CHRONIC COUGH: ICD-10-CM

## 2022-06-08 DIAGNOSIS — K76.0 NAFL (NONALCOHOLIC FATTY LIVER): ICD-10-CM

## 2022-06-08 DIAGNOSIS — K62.5 BLOOD PER RECTUM: ICD-10-CM

## 2022-06-08 DIAGNOSIS — K62.5 RECTUM BLEEDING: ICD-10-CM

## 2022-06-08 PROCEDURE — 76700 US EXAM ABDOM COMPLETE: CPT

## 2022-10-24 ENCOUNTER — HOSPITAL ENCOUNTER (OUTPATIENT)
Dept: LAB | Facility: MEDICAL CENTER | Age: 62
End: 2022-10-24
Attending: FAMILY MEDICINE
Payer: COMMERCIAL

## 2022-10-24 LAB
25(OH)D3 SERPL-MCNC: 54 NG/ML (ref 30–100)
ALBUMIN SERPL BCP-MCNC: 4.5 G/DL (ref 3.2–4.9)
ALBUMIN/GLOB SERPL: 1.6 G/DL
ALP SERPL-CCNC: 64 U/L (ref 30–99)
ALT SERPL-CCNC: 13 U/L (ref 2–50)
ANION GAP SERPL CALC-SCNC: 9 MMOL/L (ref 7–16)
AST SERPL-CCNC: 21 U/L (ref 12–45)
BASOPHILS # BLD AUTO: 1 % (ref 0–1.8)
BASOPHILS # BLD: 0.05 K/UL (ref 0–0.12)
BILIRUB SERPL-MCNC: 0.4 MG/DL (ref 0.1–1.5)
BUN SERPL-MCNC: 10 MG/DL (ref 8–22)
CALCIUM SERPL-MCNC: 10.8 MG/DL (ref 8.5–10.5)
CHLORIDE SERPL-SCNC: 108 MMOL/L (ref 96–112)
CO2 SERPL-SCNC: 25 MMOL/L (ref 20–33)
CREAT SERPL-MCNC: 0.71 MG/DL (ref 0.5–1.4)
CRP SERPL HS-MCNC: <0.2 MG/L (ref 0–3)
EOSINOPHIL # BLD AUTO: 0.43 K/UL (ref 0–0.51)
EOSINOPHIL NFR BLD: 8.3 % (ref 0–6.9)
ERYTHROCYTE [DISTWIDTH] IN BLOOD BY AUTOMATED COUNT: 40.4 FL (ref 35.9–50)
EST. AVERAGE GLUCOSE BLD GHB EST-MCNC: 103 MG/DL
ESTRADIOL SERPL-MCNC: 7 PG/ML
GFR SERPLBLD CREATININE-BSD FMLA CKD-EPI: 96 ML/MIN/1.73 M 2
GLOBULIN SER CALC-MCNC: 2.9 G/DL (ref 1.9–3.5)
GLUCOSE SERPL-MCNC: 83 MG/DL (ref 65–99)
HBA1C MFR BLD: 5.2 % (ref 4–5.6)
HCT VFR BLD AUTO: 42.9 % (ref 37–47)
HCYS SERPL-SCNC: 11.4 UMOL/L
HGB BLD-MCNC: 14.3 G/DL (ref 12–16)
IMM GRANULOCYTES # BLD AUTO: 0.01 K/UL (ref 0–0.11)
IMM GRANULOCYTES NFR BLD AUTO: 0.2 % (ref 0–0.9)
LYMPHOCYTES # BLD AUTO: 1.16 K/UL (ref 1–4.8)
LYMPHOCYTES NFR BLD: 22.4 % (ref 22–41)
MCH RBC QN AUTO: 30.6 PG (ref 27–33)
MCHC RBC AUTO-ENTMCNC: 33.3 G/DL (ref 33.6–35)
MCV RBC AUTO: 91.7 FL (ref 81.4–97.8)
MONOCYTES # BLD AUTO: 0.29 K/UL (ref 0–0.85)
MONOCYTES NFR BLD AUTO: 5.6 % (ref 0–13.4)
NEUTROPHILS # BLD AUTO: 3.23 K/UL (ref 2–7.15)
NEUTROPHILS NFR BLD: 62.5 % (ref 44–72)
NRBC # BLD AUTO: 0 K/UL
NRBC BLD-RTO: 0 /100 WBC
PLATELET # BLD AUTO: 214 K/UL (ref 164–446)
PMV BLD AUTO: 10.5 FL (ref 9–12.9)
POTASSIUM SERPL-SCNC: 4.4 MMOL/L (ref 3.6–5.5)
PROGEST SERPL-MCNC: 0.98 NG/ML
PROLACTIN SERPL-MCNC: 8.91 NG/ML (ref 2.8–26)
PROT SERPL-MCNC: 7.4 G/DL (ref 6–8.2)
RBC # BLD AUTO: 4.68 M/UL (ref 4.2–5.4)
SODIUM SERPL-SCNC: 142 MMOL/L (ref 135–145)
T3FREE SERPL-MCNC: 3.42 PG/ML (ref 2–4.4)
THYROPEROXIDASE AB SERPL-ACNC: 14 IU/ML (ref 0–9)
TSH SERPL DL<=0.005 MIU/L-ACNC: 1.01 UIU/ML (ref 0.38–5.33)
WBC # BLD AUTO: 5.2 K/UL (ref 4.8–10.8)

## 2022-10-24 PROCEDURE — 84305 ASSAY OF SOMATOMEDIN: CPT

## 2022-10-24 PROCEDURE — 82679 ASSAY OF ESTRONE: CPT

## 2022-10-24 PROCEDURE — 85025 COMPLETE CBC W/AUTO DIFF WBC: CPT

## 2022-10-24 PROCEDURE — 36415 COLL VENOUS BLD VENIPUNCTURE: CPT

## 2022-10-24 PROCEDURE — 83090 ASSAY OF HOMOCYSTEINE: CPT

## 2022-10-24 PROCEDURE — 82670 ASSAY OF TOTAL ESTRADIOL: CPT

## 2022-10-24 PROCEDURE — 86376 MICROSOMAL ANTIBODY EACH: CPT

## 2022-10-24 PROCEDURE — 84403 ASSAY OF TOTAL TESTOSTERONE: CPT

## 2022-10-24 PROCEDURE — 86800 THYROGLOBULIN ANTIBODY: CPT

## 2022-10-24 PROCEDURE — 84146 ASSAY OF PROLACTIN: CPT

## 2022-10-24 PROCEDURE — 84270 ASSAY OF SEX HORMONE GLOBUL: CPT

## 2022-10-24 PROCEDURE — 84144 ASSAY OF PROGESTERONE: CPT

## 2022-10-24 PROCEDURE — 84140 ASSAY OF PREGNENOLONE: CPT

## 2022-10-24 PROCEDURE — 84482 T3 REVERSE: CPT

## 2022-10-24 PROCEDURE — 83525 ASSAY OF INSULIN: CPT

## 2022-10-24 PROCEDURE — 84402 ASSAY OF FREE TESTOSTERONE: CPT

## 2022-10-24 PROCEDURE — 86141 C-REACTIVE PROTEIN HS: CPT

## 2022-10-24 PROCEDURE — 84481 FREE ASSAY (FT-3): CPT

## 2022-10-24 PROCEDURE — 83036 HEMOGLOBIN GLYCOSYLATED A1C: CPT

## 2022-10-24 PROCEDURE — 80053 COMPREHEN METABOLIC PANEL: CPT

## 2022-10-24 PROCEDURE — 82306 VITAMIN D 25 HYDROXY: CPT

## 2022-10-24 PROCEDURE — 84443 ASSAY THYROID STIM HORMONE: CPT

## 2022-10-26 LAB
IGF-I SERPL-MCNC: 140 NG/ML (ref 40–244)
IGF-I Z-SCORE SERPL: 0.4
INSULIN P FAST SERPL-ACNC: 7 UIU/ML (ref 3–25)
THYROGLOB AB SERPL-ACNC: <0.9 IU/ML (ref 0–4)

## 2022-10-27 LAB — ESTRONE SERPL-MCNC: 24 PG/ML

## 2022-10-28 LAB — PREG SERPL-MCNC: 97 NG/DL (ref 15–132)

## 2022-10-30 LAB — T3REVERSE SERPL-MCNC: 11.6 NG/DL (ref 9–27)

## 2022-10-31 LAB
SHBG SERPL-SCNC: 122 NMOL/L (ref 17–125)
TESTOST FREE SERPL-MCNC: 1.4 PG/ML (ref 0.6–3.8)
TESTOST SERPL-MCNC: 21 NG/DL (ref 5–32)

## 2023-06-02 ENCOUNTER — HOSPITAL ENCOUNTER (OUTPATIENT)
Dept: LAB | Facility: MEDICAL CENTER | Age: 63
End: 2023-06-02
Attending: FAMILY MEDICINE
Payer: COMMERCIAL

## 2023-06-02 LAB
BASOPHILS # BLD AUTO: 1.2 % (ref 0–1.8)
BASOPHILS # BLD: 0.06 K/UL (ref 0–0.12)
EOSINOPHIL # BLD AUTO: 0.78 K/UL (ref 0–0.51)
EOSINOPHIL NFR BLD: 15.4 % (ref 0–6.9)
ERYTHROCYTE [DISTWIDTH] IN BLOOD BY AUTOMATED COUNT: 41.4 FL (ref 35.9–50)
EST. AVERAGE GLUCOSE BLD GHB EST-MCNC: 108 MG/DL
HBA1C MFR BLD: 5.4 % (ref 4–5.6)
HCT VFR BLD AUTO: 47.5 % (ref 37–47)
HGB BLD-MCNC: 15.7 G/DL (ref 12–16)
IMM GRANULOCYTES # BLD AUTO: 0.01 K/UL (ref 0–0.11)
IMM GRANULOCYTES NFR BLD AUTO: 0.2 % (ref 0–0.9)
LYMPHOCYTES # BLD AUTO: 1.42 K/UL (ref 1–4.8)
LYMPHOCYTES NFR BLD: 28 % (ref 22–41)
MCH RBC QN AUTO: 30.4 PG (ref 27–33)
MCHC RBC AUTO-ENTMCNC: 33.1 G/DL (ref 32.2–35.5)
MCV RBC AUTO: 91.9 FL (ref 81.4–97.8)
MONOCYTES # BLD AUTO: 0.43 K/UL (ref 0–0.85)
MONOCYTES NFR BLD AUTO: 8.5 % (ref 0–13.4)
NEUTROPHILS # BLD AUTO: 2.38 K/UL (ref 1.82–7.42)
NEUTROPHILS NFR BLD: 46.7 % (ref 44–72)
NRBC # BLD AUTO: 0 K/UL
NRBC BLD-RTO: 0 /100 WBC (ref 0–0.2)
PLATELET # BLD AUTO: 279 K/UL (ref 164–446)
PMV BLD AUTO: 10.6 FL (ref 9–12.9)
RBC # BLD AUTO: 5.17 M/UL (ref 4.2–5.4)
WBC # BLD AUTO: 5.1 K/UL (ref 4.8–10.8)

## 2023-06-02 PROCEDURE — 84146 ASSAY OF PROLACTIN: CPT

## 2023-06-02 PROCEDURE — 80061 LIPID PANEL: CPT

## 2023-06-02 PROCEDURE — 99001 SPECIMEN HANDLING PT-LAB: CPT

## 2023-06-02 PROCEDURE — 84144 ASSAY OF PROGESTERONE: CPT

## 2023-06-02 PROCEDURE — 84403 ASSAY OF TOTAL TESTOSTERONE: CPT

## 2023-06-02 PROCEDURE — 82679 ASSAY OF ESTRONE: CPT

## 2023-06-02 PROCEDURE — 82670 ASSAY OF TOTAL ESTRADIOL: CPT

## 2023-06-02 PROCEDURE — 84402 ASSAY OF FREE TESTOSTERONE: CPT

## 2023-06-02 PROCEDURE — 84305 ASSAY OF SOMATOMEDIN: CPT

## 2023-06-02 PROCEDURE — 82306 VITAMIN D 25 HYDROXY: CPT

## 2023-06-02 PROCEDURE — 84140 ASSAY OF PREGNENOLONE: CPT

## 2023-06-02 PROCEDURE — 84443 ASSAY THYROID STIM HORMONE: CPT

## 2023-06-02 PROCEDURE — 86800 THYROGLOBULIN ANTIBODY: CPT

## 2023-06-02 PROCEDURE — 36415 COLL VENOUS BLD VENIPUNCTURE: CPT

## 2023-06-02 PROCEDURE — 84270 ASSAY OF SEX HORMONE GLOBUL: CPT

## 2023-06-02 PROCEDURE — 84481 FREE ASSAY (FT-3): CPT

## 2023-06-02 PROCEDURE — 80053 COMPREHEN METABOLIC PANEL: CPT

## 2023-06-02 PROCEDURE — 85025 COMPLETE CBC W/AUTO DIFF WBC: CPT

## 2023-06-02 PROCEDURE — 83525 ASSAY OF INSULIN: CPT

## 2023-06-02 PROCEDURE — 82627 DEHYDROEPIANDROSTERONE: CPT

## 2023-06-02 PROCEDURE — 84439 ASSAY OF FREE THYROXINE: CPT

## 2023-06-02 PROCEDURE — 83036 HEMOGLOBIN GLYCOSYLATED A1C: CPT

## 2023-06-02 PROCEDURE — 86376 MICROSOMAL ANTIBODY EACH: CPT

## 2023-06-02 PROCEDURE — 83090 ASSAY OF HOMOCYSTEINE: CPT

## 2023-06-02 PROCEDURE — 86141 C-REACTIVE PROTEIN HS: CPT

## 2023-06-02 PROCEDURE — 84482 T3 REVERSE: CPT

## 2023-06-03 LAB
25(OH)D3 SERPL-MCNC: 60 NG/ML (ref 30–100)
ALBUMIN SERPL BCP-MCNC: 4.7 G/DL (ref 3.2–4.9)
ALBUMIN/GLOB SERPL: 1.6 G/DL
ALP SERPL-CCNC: 77 U/L (ref 30–99)
ALT SERPL-CCNC: 28 U/L (ref 2–50)
ANION GAP SERPL CALC-SCNC: 14 MMOL/L (ref 7–16)
AST SERPL-CCNC: 30 U/L (ref 12–45)
BILIRUB SERPL-MCNC: 0.4 MG/DL (ref 0.1–1.5)
BUN SERPL-MCNC: 12 MG/DL (ref 8–22)
CALCIUM ALBUM COR SERPL-MCNC: 10.5 MG/DL (ref 8.5–10.5)
CALCIUM SERPL-MCNC: 11.1 MG/DL (ref 8.5–10.5)
CHLORIDE SERPL-SCNC: 105 MMOL/L (ref 96–112)
CHOLEST SERPL-MCNC: 206 MG/DL (ref 100–199)
CO2 SERPL-SCNC: 23 MMOL/L (ref 20–33)
CREAT SERPL-MCNC: 0.82 MG/DL (ref 0.5–1.4)
CRP SERPL HS-MCNC: <0.2 MG/L (ref 0–3)
DHEA-S SERPL-MCNC: 225 UG/DL (ref 18.9–205)
ESTRADIOL SERPL-MCNC: <5 PG/ML
FASTING STATUS PATIENT QL REPORTED: NORMAL
GFR SERPLBLD CREATININE-BSD FMLA CKD-EPI: 80 ML/MIN/1.73 M 2
GLOBULIN SER CALC-MCNC: 2.9 G/DL (ref 1.9–3.5)
GLUCOSE SERPL-MCNC: 89 MG/DL (ref 65–99)
HCYS SERPL-SCNC: 8.3 UMOL/L
HDLC SERPL-MCNC: 88 MG/DL
LDLC SERPL CALC-MCNC: 107 MG/DL
POTASSIUM SERPL-SCNC: 4.9 MMOL/L (ref 3.6–5.5)
PROGEST SERPL-MCNC: 1.26 NG/ML
PROLACTIN SERPL-MCNC: 8.52 NG/ML (ref 2.8–26)
PROT SERPL-MCNC: 7.6 G/DL (ref 6–8.2)
SODIUM SERPL-SCNC: 142 MMOL/L (ref 135–145)
T3FREE SERPL-MCNC: 3.36 PG/ML (ref 2–4.4)
T4 FREE SERPL-MCNC: 1.13 NG/DL (ref 0.93–1.7)
THYROPEROXIDASE AB SERPL-ACNC: 18 IU/ML (ref 0–9)
TRIGL SERPL-MCNC: 56 MG/DL (ref 0–149)
TSH SERPL DL<=0.005 MIU/L-ACNC: 1.63 UIU/ML (ref 0.38–5.33)

## 2023-06-04 LAB
IGF-I SERPL-MCNC: 177 NG/ML (ref 38–244)
IGF-I Z-SCORE SERPL: 0.9
INSULIN P FAST SERPL-ACNC: 10 UIU/ML (ref 3–25)
THYROGLOB AB SERPL-ACNC: <0.9 IU/ML (ref 0–4)

## 2023-06-06 LAB
PREG SERPL-MCNC: 189 NG/DL (ref 15–132)
T3REVERSE SERPL-MCNC: 11.1 NG/DL (ref 9–27)

## 2023-06-07 LAB
SHBG SERPL-SCNC: 116 NMOL/L (ref 17–125)
TESTOST FREE SERPL-MCNC: 1.7 PG/ML (ref 0.6–3.8)
TESTOST SERPL-MCNC: 25 NG/DL (ref 5–32)

## 2023-07-10 LAB — ESTRONE SERPL-MCNC: 32.7 PG/ML

## 2024-02-08 ENCOUNTER — HOSPITAL ENCOUNTER (OUTPATIENT)
Dept: LAB | Facility: MEDICAL CENTER | Age: 64
End: 2024-02-08
Attending: FAMILY MEDICINE
Payer: COMMERCIAL

## 2024-02-08 LAB
25(OH)D3 SERPL-MCNC: 80 NG/ML (ref 30–100)
ALBUMIN SERPL BCP-MCNC: 4.3 G/DL (ref 3.2–4.9)
ALBUMIN/GLOB SERPL: 1.4 G/DL
ALP SERPL-CCNC: 83 U/L (ref 30–99)
ALT SERPL-CCNC: 15 U/L (ref 2–50)
ANION GAP SERPL CALC-SCNC: 13 MMOL/L (ref 7–16)
AST SERPL-CCNC: 17 U/L (ref 12–45)
BASOPHILS # BLD AUTO: 0.9 % (ref 0–1.8)
BASOPHILS # BLD: 0.05 K/UL (ref 0–0.12)
BILIRUB SERPL-MCNC: 0.5 MG/DL (ref 0.1–1.5)
BUN SERPL-MCNC: 10 MG/DL (ref 8–22)
CALCIUM ALBUM COR SERPL-MCNC: 10.4 MG/DL (ref 8.5–10.5)
CALCIUM SERPL-MCNC: 10.6 MG/DL (ref 8.5–10.5)
CHLORIDE SERPL-SCNC: 106 MMOL/L (ref 96–112)
CHOLEST SERPL-MCNC: 178 MG/DL (ref 100–199)
CO2 SERPL-SCNC: 23 MMOL/L (ref 20–33)
CREAT SERPL-MCNC: 0.8 MG/DL (ref 0.5–1.4)
CRP SERPL HS-MCNC: <0.2 MG/L (ref 0–3)
DHEA-S SERPL-MCNC: 148 UG/DL (ref 18.9–205)
EOSINOPHIL # BLD AUTO: 0.42 K/UL (ref 0–0.51)
EOSINOPHIL NFR BLD: 7.9 % (ref 0–6.9)
ERYTHROCYTE [DISTWIDTH] IN BLOOD BY AUTOMATED COUNT: 38.9 FL (ref 35.9–50)
EST. AVERAGE GLUCOSE BLD GHB EST-MCNC: 103 MG/DL
ESTRADIOL SERPL-MCNC: <5 PG/ML
FASTING STATUS PATIENT QL REPORTED: NORMAL
GFR SERPLBLD CREATININE-BSD FMLA CKD-EPI: 82 ML/MIN/1.73 M 2
GLOBULIN SER CALC-MCNC: 3 G/DL (ref 1.9–3.5)
GLUCOSE SERPL-MCNC: 94 MG/DL (ref 65–99)
HBA1C MFR BLD: 5.2 % (ref 4–5.6)
HCT VFR BLD AUTO: 45.5 % (ref 37–47)
HCYS SERPL-SCNC: 11.65 UMOL/L
HDLC SERPL-MCNC: 67 MG/DL
HGB BLD-MCNC: 15.5 G/DL (ref 12–16)
IMM GRANULOCYTES # BLD AUTO: 0.01 K/UL (ref 0–0.11)
IMM GRANULOCYTES NFR BLD AUTO: 0.2 % (ref 0–0.9)
LDLC SERPL CALC-MCNC: 96 MG/DL
LYMPHOCYTES # BLD AUTO: 1.74 K/UL (ref 1–4.8)
LYMPHOCYTES NFR BLD: 32.5 % (ref 22–41)
MCH RBC QN AUTO: 30.8 PG (ref 27–33)
MCHC RBC AUTO-ENTMCNC: 34.1 G/DL (ref 32.2–35.5)
MCV RBC AUTO: 90.3 FL (ref 81.4–97.8)
MONOCYTES # BLD AUTO: 0.38 K/UL (ref 0–0.85)
MONOCYTES NFR BLD AUTO: 7.1 % (ref 0–13.4)
NEUTROPHILS # BLD AUTO: 2.75 K/UL (ref 1.82–7.42)
NEUTROPHILS NFR BLD: 51.4 % (ref 44–72)
NRBC # BLD AUTO: 0 K/UL
NRBC BLD-RTO: 0 /100 WBC (ref 0–0.2)
PLATELET # BLD AUTO: 264 K/UL (ref 164–446)
PMV BLD AUTO: 10.1 FL (ref 9–12.9)
POTASSIUM SERPL-SCNC: 3.9 MMOL/L (ref 3.6–5.5)
PROGEST SERPL-MCNC: 1.28 NG/ML
PROLACTIN SERPL-MCNC: 17.8 NG/ML (ref 2.8–26)
PROT SERPL-MCNC: 7.3 G/DL (ref 6–8.2)
RBC # BLD AUTO: 5.04 M/UL (ref 4.2–5.4)
SODIUM SERPL-SCNC: 142 MMOL/L (ref 135–145)
T3FREE SERPL-MCNC: 3.28 PG/ML (ref 2–4.4)
TRIGL SERPL-MCNC: 75 MG/DL (ref 0–149)
TSH SERPL DL<=0.005 MIU/L-ACNC: 1.82 UIU/ML (ref 0.38–5.33)
WBC # BLD AUTO: 5.4 K/UL (ref 4.8–10.8)

## 2024-02-08 PROCEDURE — 83036 HEMOGLOBIN GLYCOSYLATED A1C: CPT

## 2024-02-08 PROCEDURE — 84146 ASSAY OF PROLACTIN: CPT

## 2024-02-08 PROCEDURE — 84144 ASSAY OF PROGESTERONE: CPT

## 2024-02-08 PROCEDURE — 84305 ASSAY OF SOMATOMEDIN: CPT

## 2024-02-08 PROCEDURE — 80061 LIPID PANEL: CPT

## 2024-02-08 PROCEDURE — 84270 ASSAY OF SEX HORMONE GLOBUL: CPT

## 2024-02-08 PROCEDURE — 84403 ASSAY OF TOTAL TESTOSTERONE: CPT

## 2024-02-08 PROCEDURE — 84482 T3 REVERSE: CPT

## 2024-02-08 PROCEDURE — 82306 VITAMIN D 25 HYDROXY: CPT

## 2024-02-08 PROCEDURE — 82670 ASSAY OF TOTAL ESTRADIOL: CPT

## 2024-02-08 PROCEDURE — 82627 DEHYDROEPIANDROSTERONE: CPT

## 2024-02-08 PROCEDURE — 82679 ASSAY OF ESTRONE: CPT

## 2024-02-08 PROCEDURE — 80053 COMPREHEN METABOLIC PANEL: CPT

## 2024-02-08 PROCEDURE — 84140 ASSAY OF PREGNENOLONE: CPT

## 2024-02-08 PROCEDURE — 86141 C-REACTIVE PROTEIN HS: CPT

## 2024-02-08 PROCEDURE — 83525 ASSAY OF INSULIN: CPT

## 2024-02-08 PROCEDURE — 84443 ASSAY THYROID STIM HORMONE: CPT

## 2024-02-08 PROCEDURE — 84402 ASSAY OF FREE TESTOSTERONE: CPT

## 2024-02-08 PROCEDURE — 36415 COLL VENOUS BLD VENIPUNCTURE: CPT

## 2024-02-08 PROCEDURE — 85025 COMPLETE CBC W/AUTO DIFF WBC: CPT

## 2024-02-08 PROCEDURE — 84481 FREE ASSAY (FT-3): CPT

## 2024-02-08 PROCEDURE — 83090 ASSAY OF HOMOCYSTEINE: CPT

## 2024-02-09 LAB
IGF-I SERPL-MCNC: 162 NG/ML (ref 38–244)
IGF-I Z-SCORE SERPL: 0.8
INSULIN P FAST SERPL-ACNC: 12 UIU/ML (ref 3–25)

## 2024-02-11 LAB
ESTRONE SERPL-MCNC: 22.6 PG/ML
PREG SERPL-MCNC: 249 NG/DL (ref 15–132)

## 2024-02-12 LAB
SHBG SERPL-SCNC: 94 NMOL/L (ref 17–125)
T3REVERSE SERPL-MCNC: 12.6 NG/DL (ref 9–27)
TESTOST FREE SERPL-MCNC: 1.9 PG/ML (ref 0.6–3.8)
TESTOST SERPL-MCNC: 23 NG/DL (ref 5–32)

## 2024-03-05 ENCOUNTER — HOSPITAL ENCOUNTER (OUTPATIENT)
Dept: RADIOLOGY | Facility: MEDICAL CENTER | Age: 64
End: 2024-03-05
Attending: FAMILY MEDICINE
Payer: COMMERCIAL

## 2024-03-05 DIAGNOSIS — I20.9 ANGINAL SYNDROME (HCC): ICD-10-CM

## 2024-03-05 DIAGNOSIS — R06.09 DYSPNEA ON EXERTION: ICD-10-CM

## 2024-03-05 DIAGNOSIS — J40 BRONCHITIS, NOT SPECIFIED AS ACUTE OR CHRONIC: ICD-10-CM

## 2024-03-05 PROCEDURE — 93017 CV STRESS TEST TRACING ONLY: CPT

## 2024-03-05 PROCEDURE — 71046 X-RAY EXAM CHEST 2 VIEWS: CPT

## 2024-03-05 PROCEDURE — 93018 CV STRESS TEST I&R ONLY: CPT | Performed by: INTERNAL MEDICINE

## 2024-08-06 ENCOUNTER — OFFICE VISIT (OUTPATIENT)
Dept: SPORTS MEDICINE | Facility: OTHER | Age: 64
End: 2024-08-06
Payer: COMMERCIAL

## 2024-08-06 VITALS
HEART RATE: 78 BPM | WEIGHT: 151.2 LBS | HEIGHT: 67 IN | OXYGEN SATURATION: 97 % | DIASTOLIC BLOOD PRESSURE: 76 MMHG | RESPIRATION RATE: 16 BRPM | TEMPERATURE: 97 F | BODY MASS INDEX: 23.73 KG/M2 | SYSTOLIC BLOOD PRESSURE: 118 MMHG

## 2024-08-06 DIAGNOSIS — M53.3 SACROILIAC JOINT DYSFUNCTION OF RIGHT SIDE: ICD-10-CM

## 2024-08-06 PROCEDURE — 3074F SYST BP LT 130 MM HG: CPT | Performed by: FAMILY MEDICINE

## 2024-08-06 PROCEDURE — 3078F DIAST BP <80 MM HG: CPT | Performed by: FAMILY MEDICINE

## 2024-08-06 PROCEDURE — 99214 OFFICE O/P EST MOD 30 MIN: CPT | Performed by: FAMILY MEDICINE

## 2024-08-06 RX ORDER — KETOROLAC TROMETHAMINE 30 MG/ML
30 INJECTION, SOLUTION INTRAMUSCULAR; INTRAVENOUS ONCE
Status: COMPLETED | OUTPATIENT
Start: 2024-08-06 | End: 2024-08-06

## 2024-08-06 RX ORDER — CYCLOBENZAPRINE HCL 10 MG
10 TABLET ORAL NIGHTLY PRN
Qty: 20 TABLET | Refills: 0 | Status: SHIPPED | OUTPATIENT
Start: 2024-08-06

## 2024-08-06 RX ADMIN — KETOROLAC TROMETHAMINE 30 MG: 30 INJECTION, SOLUTION INTRAMUSCULAR; INTRAVENOUS at 13:31

## 2024-08-06 ASSESSMENT — ENCOUNTER SYMPTOMS
NAUSEA: 0
VOMITING: 0
SHORTNESS OF BREATH: 0
CHILLS: 0
DIZZINESS: 0
FEVER: 0

## 2024-08-06 ASSESSMENT — FIBROSIS 4 INDEX: FIB4 SCORE: 1.06

## 2024-08-06 NOTE — PROGRESS NOTES
Chief Complaint   Patient presents with    Back Pain     Back pain        Subjective     Self-referred for evaluation of RIGHT sacral/lumbar pain  Insidious onset around early July 2024  Pain is sharp predominantly at the right sacral region  Feels like a bruise being pressed on also in the region of the ischium with pain down the posterior RIGHT thigh which radiates all the way down to the plantar aspect of the RIGHT foot  Worse with standing, sitting  Hard to find a comfortable position  Improved with hydrocodone  Ketorolac helped temporarily  POSITIVE night symptoms with night waking secondary to pain  She has noticed some subjective weakness of the RIGHT foot  No loss of control of urine or stool, but she does feel like she has to strain a bit harder to move her bowels    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   Respiratory:  Negative for shortness of breath.    Cardiovascular:  Negative for chest pain.   Gastrointestinal:  Negative for nausea and vomiting.   Neurological:  Negative for dizziness.     PMH:  has a past medical history of Anxiety.  MEDS:   Current Outpatient Medications:     cyclobenzaprine (FLEXERIL) 10 mg Tab, Take 1 Tablet by mouth at bedtime as needed for Moderate Pain (1/2 to 1 tab at HS PRN). May cause drowsiness (do not operate heavy machinery)., Disp: 20 Tablet, Rfl: 0    benzonatate (TESSALON) 100 MG Cap, Take 1 Capsule by mouth 3 times a day as needed., Disp: 30 Capsule, Rfl: 0    guaiFENesin (MUCINEX PO), Take  by mouth., Disp: , Rfl:     progesterone (PROMETRIUM) 100 MG Cap, Take 100 mg by mouth at bedtime., Disp: , Rfl:     vitamin D3, cholecalciferol, 1000 UNIT Tab, Take 2 Tabs by mouth every day. (Patient taking differently: Take 2,000 Units by mouth every 48 hours.), Disp: 100 Tab, Rfl: 3    alprazolam (XANAX) 0.25 MG Tab, Take 1 Tab by mouth 3 times a day as needed for Anxiety., Disp: 30 Tab, Rfl:  "3  ALLERGIES:   Allergies   Allergen Reactions    Latex Rash     SURGHX:   Past Surgical History:   Procedure Laterality Date    LIPOMA EXCISION       SOCHX:  reports that she has never smoked. She has never used smokeless tobacco. She reports current alcohol use. She reports that she does not use drugs.  FH: Family history was reviewed, no pertinent findings to report    Objective   /76 (BP Location: Left arm, Patient Position: Sitting, BP Cuff Size: Adult)   Pulse 78   Temp 36.1 °C (97 °F) (Temporal)   Resp 16   Ht 1.702 m (5' 7\")   Wt 68.6 kg (151 lb 3.2 oz)   SpO2 97%   BMI 23.68 kg/m²     Lumbar spine exam:  No acute distress  Able to walk on heels and toes  Able to flex to 90° with POSITIVE discomfort and difficulty getting back needing to push herself off of her thighs  Extension and lateral rotation with some discomfort  Strength testing with hip flexion, knee flexion and extension, ankle dorsiflexion and plantarflexion, and EHL testing were 5 out of 5 bilaterally  Sensation was intact bilaterally  The legs were otherwise neurovascularly intact    HIP EXAM:  NORMAL gait    Right hip: Range of motion is intact  NEGATIVE pain with internal rotation  alli's test is positive with tenderness at the SI joint  NO tenderness of the trochanteric bursa  NO tenderness of the gluteus medius  Charles's test is NEGATIVE    Left hip: Range of motion is intact  NEGATIVE pain with internal rotation  alli's test is NEGATIVE  NO tenderness of the trochanteric bursa  NO tenderness of the gluteus medius  Charles's test is NEGATIVE    1. Sacroiliac joint dysfunction of right side  ketorolac (Toradol) injection 30 mg    cyclobenzaprine (FLEXERIL) 10 mg Tab        Insidious onset around early July 2024  Pain is sharp predominantly at the right sacral region    Home exercise program provided for SI joint  FAILED SI belt    Return in about 2 weeks (around 8/20/2024).  See how she is doing home exercise program  At that point " she will have had pain for 6 weeks and we can consider lumbar spine x-rays if her pain persists    Self-referred

## 2024-08-20 ENCOUNTER — APPOINTMENT (OUTPATIENT)
Dept: RADIOLOGY | Facility: OTHER | Age: 64
End: 2024-08-20
Attending: FAMILY MEDICINE
Payer: COMMERCIAL

## 2024-08-20 ENCOUNTER — OFFICE VISIT (OUTPATIENT)
Dept: SPORTS MEDICINE | Facility: OTHER | Age: 64
End: 2024-08-20
Payer: COMMERCIAL

## 2024-08-20 VITALS
HEART RATE: 71 BPM | OXYGEN SATURATION: 96 % | RESPIRATION RATE: 16 BRPM | WEIGHT: 151.2 LBS | BODY MASS INDEX: 23.73 KG/M2 | DIASTOLIC BLOOD PRESSURE: 74 MMHG | SYSTOLIC BLOOD PRESSURE: 118 MMHG | TEMPERATURE: 97.7 F | HEIGHT: 67 IN

## 2024-08-20 DIAGNOSIS — M53.3 SACROILIAC JOINT DYSFUNCTION OF RIGHT SIDE: ICD-10-CM

## 2024-08-20 PROCEDURE — 3078F DIAST BP <80 MM HG: CPT | Performed by: FAMILY MEDICINE

## 2024-08-20 PROCEDURE — 72100 X-RAY EXAM L-S SPINE 2/3 VWS: CPT | Mod: TC,FY | Performed by: RADIOLOGY

## 2024-08-20 PROCEDURE — 99213 OFFICE O/P EST LOW 20 MIN: CPT | Performed by: FAMILY MEDICINE

## 2024-08-20 PROCEDURE — 3074F SYST BP LT 130 MM HG: CPT | Performed by: FAMILY MEDICINE

## 2024-08-20 ASSESSMENT — FIBROSIS 4 INDEX: FIB4 SCORE: 1.06

## 2024-08-20 NOTE — PROGRESS NOTES
"Chief Complaint   Patient presents with    Back Pain     Low back pain      Subjective     Self-referred for evaluation of RIGHT sacral/lumbar pain  Insidious onset around early July 2024  Pain is sharp predominantly at the right sacral region  Feels like a bruise being pressed on also in the region of the ischium with pain down the posterior RIGHT thigh which radiates all the way down to the plantar aspect of the RIGHT foot  Worse with standing, sitting  Hard to find a comfortable position  Improved with hydrocodone  Ketorolac helped temporarily  POSITIVE night symptoms with night waking secondary to pain  She has noticed some subjective weakness of the RIGHT foot  No loss of control of urine or stool, but she does feel like she has to strain a bit harder to move her bowels    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    Objective   /74 (BP Location: Left arm, Patient Position: Sitting, BP Cuff Size: Adult)   Pulse 71   Temp 36.5 °C (97.7 °F) (Temporal)   Resp 16   Ht 1.702 m (5' 7\")   Wt 68.6 kg (151 lb 3.2 oz)   SpO2 96%   BMI 23.68 kg/m²     Lumbar spine exam:  No acute distress  Able to walk on heels and toes  Able to flex to 90° with POSITIVE discomfort and difficulty getting back needing to push herself off of her thighs  Extension and lateral rotation with some discomfort  Strength testing with hip flexion, knee flexion and extension, ankle dorsiflexion and plantarflexion, and EHL testing were 5 out of 5 bilaterally  Sensation was intact bilaterally  The legs were otherwise neurovascularly intact    HIP EXAM:  NORMAL gait    Right hip: Range of motion is intact  NEGATIVE pain with internal rotation  alli's test is positive with tenderness at the SI joint  NO tenderness of the trochanteric bursa  NO tenderness of the gluteus medius  Charles's test is NEGATIVE    Left hip: Range of motion is intact  NEGATIVE pain with internal rotation  alil's test is NEGATIVE  NO tenderness of the trochanteric " bursa  NO tenderness of the gluteus medius  Charles's test is NEGATIVE    Weak single leg bridge on the RIGHT > L    1. Sacroiliac joint dysfunction of right side  DX-LUMBAR SPINE-2 OR 3 VIEWS    Referral to Physical Therapy        Insidious onset around early July 2024  Pain is sharp predominantly at the right sacral region    Home exercise program provided for SI joint  FAILED SI belt    [facility] (with Marisol)            8/20/2024 8:17 AM     HISTORY/REASON FOR EXAM: Atraumatic in the lower back and right SI joint radiating to the right lower extremity for 2 months     TECHNIQUE/ EXAM DESCRIPTION AND NUMBER OF VIEWS:  3 views of the lumbar spine.     COMPARISON: None.     FINDINGS:  There are 5 non-rib bearing lumbar type vertebral bodies.     No acute fracture is detected.  The vertebral body heights are preserved.     The alignment of the lumbar spine is normal.     Intervertebral body disk spaces are maintained but there is minimal endplate spurring anteriorly at L1/2/3 .     Facet joints: Caudal facet arthropathy is mild     No pars inter-articularis defect is seen.     Normal anterior-posterior appearance of the SI joints. No sacroiliitis or fusion     IMPRESSION:     Minimal degenerative disc disease and facet hypertrophy           Exam Ended: 08/20/24  8:18 AM Last Resulted: 08/20/24  8:44 AM          See how she is doing home exercise program  At that point she will have had pain for 6 weeks and we can consider lumbar spine x-rays if her pain persists    Self-referred

## 2024-09-23 ENCOUNTER — OFFICE VISIT (OUTPATIENT)
Dept: SPORTS MEDICINE | Facility: OTHER | Age: 64
End: 2024-09-23
Payer: COMMERCIAL

## 2024-09-23 VITALS
TEMPERATURE: 97.9 F | DIASTOLIC BLOOD PRESSURE: 72 MMHG | BODY MASS INDEX: 23.73 KG/M2 | HEART RATE: 72 BPM | SYSTOLIC BLOOD PRESSURE: 116 MMHG | HEIGHT: 67 IN | OXYGEN SATURATION: 98 % | WEIGHT: 151.2 LBS | RESPIRATION RATE: 14 BRPM

## 2024-09-23 DIAGNOSIS — M53.3 SACROILIAC JOINT DYSFUNCTION OF RIGHT SIDE: ICD-10-CM

## 2024-09-23 PROCEDURE — 3078F DIAST BP <80 MM HG: CPT | Performed by: FAMILY MEDICINE

## 2024-09-23 PROCEDURE — 99213 OFFICE O/P EST LOW 20 MIN: CPT | Performed by: FAMILY MEDICINE

## 2024-09-23 PROCEDURE — 3074F SYST BP LT 130 MM HG: CPT | Performed by: FAMILY MEDICINE

## 2024-09-23 ASSESSMENT — FIBROSIS 4 INDEX: FIB4 SCORE: 1.06

## 2024-09-23 NOTE — Clinical Note
Sandro Aguilar, I am sending you Fela, she is a nice lady suffering from SI joint pain. Hoping you can do an SI joint injection.  I do think she has some increased signs of OA at the RIGHT SI joint compared to the left on x-ray.  Hope you are well! Respectfully,  LORENA Rosales M.D. [facility] [phone]

## 2024-09-23 NOTE — PROGRESS NOTES
"Chief Complaint   Patient presents with    Hip Pain     R hip pain      Subjective     Self-referred for evaluation of RIGHT sacral/lumbar pain  Insidious onset around early July 2024  Pain is sharp predominantly at the right sacral region  Feels like a bruise being pressed on also in the region of the ischium with pain down the posterior RIGHT thigh which radiates all the way down to the plantar aspect of the RIGHT foot  Worse with standing, sitting  Hard to find a comfortable position  Improved with hydrocodone  Ketorolac helped temporarily  POSITIVE night symptoms with night waking secondary to pain  She has noticed some subjective weakness of the RIGHT foot  No loss of control of urine or stool, but she does feel like she has to strain a bit harder to move her bowels    Update:  Symptoms are the same or perhaps worse  Localized to the RIGHT SI joint region  She has more months where she feels better, but if she does too much her pain regresses and she gets worse again      Window cleaning business, active  Computer work    Objective   /72 (BP Location: Left arm, Patient Position: Sitting, BP Cuff Size: Adult)   Pulse 72   Temp 36.6 °C (97.9 °F) (Temporal)   Resp 14   Ht 1.702 m (5' 7\")   Wt 68.6 kg (151 lb 3.2 oz)   SpO2 98%   BMI 23.68 kg/m²     Lumbar spine exam:  No acute distress  Able to walk on heels and toes  Able to flex to 90° with POSITIVE discomfort and difficulty getting back needing to push herself off of her thighs  Extension and lateral rotation with some discomfort  Exquisite tenderness at the RIGHT SI joint  Strength testing with hip flexion, knee flexion and extension, ankle dorsiflexion and plantarflexion, and EHL testing were 5 out of 5 bilaterally  Sensation was intact bilaterally  The legs were otherwise neurovascularly intact    alli's test is positive with tenderness at the SI joint    Weak single leg bridge on the RIGHT > L    1. Sacroiliac joint dysfunction of right side  " Referral to Pain Clinic        Insidious onset around early July 2024  Pain is sharp predominantly at the right sacral region  Feels like a bruise being pressed on also in the region of the ischium with pain down the posterior RIGHT thigh which radiates all the way down to the plantar aspect of the RIGHT foot    Update:  Symptoms are the same or worse  Localized to the RIGHT SI joint region  Fortunately, radicular symptoms past the knee are not as frequent, but her pain is more severe and more localized to the SI joint region  She has more months where she feels better, but if she does too much her pain regresses and she gets worse again    We discussed the option of RIGHT SI joint injection  Referred for consideration of RIGHT SI joint corticosteroid injection    Failing home exercise program and Sports PT [facility] (with Marisol)            8/20/2024 8:17 AM     HISTORY/REASON FOR EXAM: Atraumatic in the lower back and right SI joint radiating to the right lower extremity for 2 months     TECHNIQUE/ EXAM DESCRIPTION AND NUMBER OF VIEWS:  3 views of the lumbar spine.     COMPARISON: None.     FINDINGS:  There are 5 non-rib bearing lumbar type vertebral bodies.     No acute fracture is detected.  The vertebral body heights are preserved.     The alignment of the lumbar spine is normal.     Intervertebral body disk spaces are maintained but there is minimal endplate spurring anteriorly at L1/2/3 .     Facet joints: Caudal facet arthropathy is mild     No pars inter-articularis defect is seen.     Normal anterior-posterior appearance of the SI joints. No sacroiliitis or fusion     IMPRESSION:     Minimal degenerative disc disease and facet hypertrophy           Exam Ended: 08/20/24  8:18 AM Last Resulted: 08/20/24  8:44 AM          Self-referred

## 2024-09-25 ENCOUNTER — OFFICE VISIT (OUTPATIENT)
Dept: PHYSICAL MEDICINE AND REHAB | Facility: MEDICAL CENTER | Age: 64
End: 2024-09-25
Payer: COMMERCIAL

## 2024-09-25 VITALS
SYSTOLIC BLOOD PRESSURE: 120 MMHG | WEIGHT: 159.17 LBS | TEMPERATURE: 97.2 F | BODY MASS INDEX: 24.98 KG/M2 | OXYGEN SATURATION: 99 % | HEIGHT: 67 IN | HEART RATE: 80 BPM | DIASTOLIC BLOOD PRESSURE: 72 MMHG

## 2024-09-25 DIAGNOSIS — G57.01 PIRIFORMIS SYNDROME OF RIGHT SIDE: ICD-10-CM

## 2024-09-25 DIAGNOSIS — M79.10 MYALGIA: ICD-10-CM

## 2024-09-25 DIAGNOSIS — M54.31 SCIATICA, RIGHT SIDE: ICD-10-CM

## 2024-09-25 PROCEDURE — 3074F SYST BP LT 130 MM HG: CPT | Performed by: STUDENT IN AN ORGANIZED HEALTH CARE EDUCATION/TRAINING PROGRAM

## 2024-09-25 PROCEDURE — G2211 COMPLEX E/M VISIT ADD ON: HCPCS | Performed by: STUDENT IN AN ORGANIZED HEALTH CARE EDUCATION/TRAINING PROGRAM

## 2024-09-25 PROCEDURE — 3078F DIAST BP <80 MM HG: CPT | Performed by: STUDENT IN AN ORGANIZED HEALTH CARE EDUCATION/TRAINING PROGRAM

## 2024-09-25 PROCEDURE — 99204 OFFICE O/P NEW MOD 45 MIN: CPT | Performed by: STUDENT IN AN ORGANIZED HEALTH CARE EDUCATION/TRAINING PROGRAM

## 2024-09-25 PROCEDURE — 1125F AMNT PAIN NOTED PAIN PRSNT: CPT | Performed by: STUDENT IN AN ORGANIZED HEALTH CARE EDUCATION/TRAINING PROGRAM

## 2024-09-25 RX ORDER — MELOXICAM 15 MG/1
15 TABLET ORAL
Qty: 30 TABLET | Refills: 2 | Status: SHIPPED | OUTPATIENT
Start: 2024-09-25

## 2024-09-25 ASSESSMENT — PATIENT HEALTH QUESTIONNAIRE - PHQ9: CLINICAL INTERPRETATION OF PHQ2 SCORE: 0

## 2024-09-25 ASSESSMENT — FIBROSIS 4 INDEX: FIB4 SCORE: 1.06

## 2024-09-25 ASSESSMENT — PAIN SCALES - GENERAL: PAINLEVEL: 8=MODERATE-SEVERE PAIN

## 2024-09-25 NOTE — PROGRESS NOTES
Verbal consent was acquired by the patient to use [employer] ambient listening note generation during this visit Yes     New Patient Note    Interventional Pain and Spine  Physiatry (Physical Medicine and Rehabilitation)     Patient Name: Fela Montoya  : 1960  Date of Service: 2024  PCP: Da Bean D.O.  Referring Provider: Sae Rosales M.D.    Chief Complaint:   Chief Complaint   Patient presents with    Establish Care     SI Joint Dysfunction of R-Side       HPI  HISTORY FROM INITIAL VISIT (2024):  History of Present Illness  The patient is a 64-year-old female who presents for a new patient visit.    She reports experiencing pain near her right sacroiliac joint, describing it as feeling like sitting on a rock all day. The pain occasionally radiates across her lower back and intensifies at times.  The pain radiates down her right leg.  She also experiences a burning sensation and tightness in the area but reports no leg weakness. Despite the pain, she maintains full mobility and can perform squats without issue. The onset of these symptoms was sudden, and she is unsure of the cause.    She has sought treatment from a chiropractor, undergone acupuncture, dry needling, and physical therapy, but none have provided relief. She has attempted self-massage of the area and uses a cane for support. She has not received any injections for this issue. She reports no numbness but occasionally experiences spasms. The pain is primarily located in the sacroiliac area and does not worsen with certain movements. She has not been diagnosed with piriformis syndrome.    She has tried hydrocodone, anti-inflammatories, and muscle relaxers for pain management, but none have been effective.      Psychological testing for pain as depression and pain commonly coexist and need to both be treated.     Opioid Risk Score: 0      Interpretation of Opioid Risk Score   Score 0-3 = Low risk of abuse. Do UDS at  least once per year.  Score 4-7 = Moderate risk of abuse. Do UDS 1-4 times per year.  Score 8+ = High risk of abuse. Refer to specialist.    PHQ      9/9/2016    10:40 AM 2/15/2022     2:40 PM 9/25/2024     2:40 PM   Depression Screen (PHQ-2/PHQ-9)   PHQ-2 Total Score 0 1 0   PHQ-9 Total Score  2        Interpretation of PHQ-9 Total Score   Score Severity   1-4 No Depression   5-9 Mild Depression   10-14 Moderate Depression   15-19 Moderately Severe Depression   20-27 Severe Depression      Medical records review:  I reviewed the note from the referring provider Sae Rosales M.D. including the note dated 9/23/2024 indicating referral for right sacroiliac joint dysfunction.    ROS:   Red Flags ROS:   Fever, Chills, Sweats: Denies  Involuntary Weight Loss: Denies  Bladder Incontinence: Denies  Bowel Incontinence: denies  Saddle Anesthesia: Denies    All other systems reviewed and negative.     PMHx:   Past Medical History:   Diagnosis Date    Anxiety        PSHx:   Past Surgical History:   Procedure Laterality Date    LIPOMA EXCISION         Family Hx:   Family History   Problem Relation Age of Onset    Heart Disease Father        Social Hx:  Social History     Socioeconomic History    Marital status:      Spouse name: Not on file    Number of children: Not on file    Years of education: Not on file    Highest education level: Not on file   Occupational History    Not on file   Tobacco Use    Smoking status: Never    Smokeless tobacco: Never   Vaping Use    Vaping status: Never Used   Substance and Sexual Activity    Alcohol use: Yes     Comment: rare    Drug use: No    Sexual activity: Yes     Partners: Male     Birth control/protection: Post-Menopausal, Surgical     Comment:  has had a vasectomy   Other Topics Concern    Not on file   Social History Narrative    Not on file     Social Determinants of Health     Financial Resource Strain: Not on file   Food Insecurity: Not on file   Transportation  Needs: Not on file   Physical Activity: Not on file   Stress: Not on file   Social Connections: Not on file   Intimate Partner Violence: Not on file   Housing Stability: Not on file       Allergies:  Allergies   Allergen Reactions    Latex Rash       Medications: reviewed on epic.   Outpatient Medications Marked as Taking for the 9/25/24 encounter (Office Visit) with Sharon Merida M.D.   Medication Sig Dispense Refill    meloxicam (MOBIC) 15 MG tablet Take 1 Tablet by mouth 1 time a day as needed (pain). Take with food. Do not take other NSAIDs. No refills. 30 Tablet 2    cyclobenzaprine (FLEXERIL) 10 mg Tab Take 1 Tablet by mouth at bedtime as needed for Moderate Pain (1/2 to 1 tab at HS PRN). May cause drowsiness (do not operate heavy machinery). 20 Tablet 0    progesterone (PROMETRIUM) 100 MG Cap Take 100 mg by mouth at bedtime.      vitamin D3, cholecalciferol, 1000 UNIT Tab Take 2 Tabs by mouth every day. (Patient taking differently: Take 2,000 Units by mouth every 48 hours.) 100 Tab 3    alprazolam (XANAX) 0.25 MG Tab Take 1 Tab by mouth 3 times a day as needed for Anxiety. 30 Tab 3        Current Outpatient Medications on File Prior to Visit   Medication Sig Dispense Refill    cyclobenzaprine (FLEXERIL) 10 mg Tab Take 1 Tablet by mouth at bedtime as needed for Moderate Pain (1/2 to 1 tab at HS PRN). May cause drowsiness (do not operate heavy machinery). 20 Tablet 0    progesterone (PROMETRIUM) 100 MG Cap Take 100 mg by mouth at bedtime.      vitamin D3, cholecalciferol, 1000 UNIT Tab Take 2 Tabs by mouth every day. (Patient taking differently: Take 2,000 Units by mouth every 48 hours.) 100 Tab 3    alprazolam (XANAX) 0.25 MG Tab Take 1 Tab by mouth 3 times a day as needed for Anxiety. 30 Tab 3    benzonatate (TESSALON) 100 MG Cap Take 1 Capsule by mouth 3 times a day as needed. 30 Capsule 0    guaiFENesin (MUCINEX PO) Take  by mouth.       No current facility-administered medications on file prior to visit.  "        EXAMINATION     Physical Exam:   /72 (BP Location: Right arm, Patient Position: Sitting, BP Cuff Size: Adult)   Pulse 80   Temp 36.2 °C (97.2 °F) (Temporal)   Ht 1.702 m (5' 7\")   Wt 72.2 kg (159 lb 2.8 oz)   SpO2 99%     Constitutional:   Body Habitus: Body mass index is 24.93 kg/m².  Cooperation: Fully cooperates with exam  Appearance: Well-groomed, well-nourished.    Eyes: No scleral icterus to suggest severe liver disease, no proptosis to suggest severe hyperthyroidism    ENT -no obvious auditory deficits, no noticeable facial droop     Skin -no rashes or lesions noted     Respiratory-  breathing comfortably on room air, no audible wheezing    Cardiovascular-distal extremities warm and well perfused.  No lower extremity edema is noted.     Gastrointestinal - no obvious abdominal masses, non-distended    Psychiatric- alert and oriented ×3. Normal affect.     Gait - normal gait, no use of ambulatory device, nonantalgic. Heel walking and toe walking intact.    Musculoskeletal and Neuro -       Thoracic/Lumbar Spine/Sacral Spine/Hips   Inspection: No evidence of atrophy in bilateral lower extremities throughout     There is full active range of motion with lumbar extension    Facet loading maneuver negative bilaterally    Palpation:   Tenderness to palpation over the  right piriformis muscle . No tenderness to palpation at midline of lumbosacral spine, paraspinal muscles bilaterally, lumbar facets bilaterally spanning approximately L1-L5 levels, and sacroiliac joints bilaterally.    Lumbar spine /hip provocative exam maneuvers  Straight leg raise negative bilaterally  FADIR test negative bilaterally    SI joint tests  ANA test negative bilaterally  Thigh thrust test negative bilaterally  SI joint compression negative bilaterally  SI joint distraction negative bilaterally  Sacral thrust test negative bilaterally  Gaenslens maneuver negative bilaterally      Key points for the international " "standards for neurological classification of spinal cord injury (ISNCSCI) to light touch.   Dermatome R L   L2 2 2   L3 2 2   L4 2 2   L5 2 2   S1 2 2   S2 2 2       Motor Exam Lower Extremities  ? Myotome R L   Hip flexion L2 5 5   Knee extension L3 5 5   Ankle dorsiflexion L4 5 5   Toe extension L5 5 5   Ankle plantarflexion S1 5 5       Reflexes  ?  R L   Patella  2+ 2+   Achilles   2+ 2+     Clonus of the ankle negative bilaterally       MEDICAL DECISION MAKING    Medical records review: see under HPI section.     DATA    Labs: Personally reviewed at today's visit:     Lab Results   Component Value Date/Time    SODIUM 142 02/08/2024 08:32 AM    POTASSIUM 3.9 02/08/2024 08:32 AM    CHLORIDE 106 02/08/2024 08:32 AM    CO2 23 02/08/2024 08:32 AM    ANION 13.0 02/08/2024 08:32 AM    GLUCOSE 94 02/08/2024 08:32 AM    BUN 10 02/08/2024 08:32 AM    CREATININE 0.80 02/08/2024 08:32 AM    CALCIUM 10.6 (H) 02/08/2024 08:32 AM    ASTSGOT 17 02/08/2024 08:32 AM    ALTSGPT 15 02/08/2024 08:32 AM    TBILIRUBIN 0.5 02/08/2024 08:32 AM    ALBUMIN 4.3 02/08/2024 08:32 AM    TOTPROTEIN 7.3 02/08/2024 08:32 AM    GLOBULIN 3.0 02/08/2024 08:32 AM    AGRATIO 1.4 02/08/2024 08:32 AM       No results found for: \"PROTHROMBTM\", \"INR\"     Lab Results   Component Value Date/Time    WBC 5.4 02/08/2024 08:32 AM    RBC 5.04 02/08/2024 08:32 AM    HEMOGLOBIN 15.5 02/08/2024 08:32 AM    HEMATOCRIT 45.5 02/08/2024 08:32 AM    MCV 90.3 02/08/2024 08:32 AM    MCH 30.8 02/08/2024 08:32 AM    MCHC 34.1 02/08/2024 08:32 AM    MPV 10.1 02/08/2024 08:32 AM    NEUTSPOLYS 51.40 02/08/2024 08:32 AM    LYMPHOCYTES 32.50 02/08/2024 08:32 AM    MONOCYTES 7.10 02/08/2024 08:32 AM    EOSINOPHILS 7.90 (H) 02/08/2024 08:32 AM    BASOPHILS 0.90 02/08/2024 08:32 AM        Lab Results   Component Value Date/Time    HBA1C 5.2 02/08/2024 08:32 AM        Imaging:   I personally reviewed following images, these are my reads  X-ray lumbar spine 8/20/2024  Lumbar " spondylosis at bilateral lower lumbar levels.  Alignment intact.See formal radiology report for further details.          IMAGING radiology reads. I reviewed the following radiology reads       Results for orders placed in visit on 02/15/22    MR-BRAIN-W/O    Impression  1.  Unremarkable noncontrast MR examination of the brain except for a few punctate nonspecific subcortical T2 hyperintensities of no clinical significance.  2.  Mucosal thickening/fluid in the right maxillary sinus.             Results for orders placed in visit on 22    MR-CERVICAL SPINE-W/O    Impression  Mild degenerative disease in the cervical spine as described above.                                                    Results for orders placed during the hospital encounter of 24    DX-CHEST-2 VIEWS    Impression  NEGATIVE TWO VIEWS OF THE CHEST.                      Results for orders placed in visit on 24    DX-LUMBAR SPINE-2 OR 3 VIEWS    Impression  Minimal degenerative disc disease and facet hypertrophy                         Diagnosis  Visit Diagnoses     ICD-10-CM   1. Piriformis syndrome of right side  G57.01   2. Sciatica, right side  M54.31   3. Myalgia  M79.10         ASSESSMENT AND PLAN:  Fela Montoya ( 1960) is a female      Fela was seen today for establish care.    Diagnoses and all orders for this visit:    Piriformis syndrome of right side  -     Referral to Pain Clinic    Sciatica, right side  -     MR-LUMBAR SPINE-W/O; Future    Myalgia  -     Referral to Pain Clinic    Other orders  -     meloxicam (MOBIC) 15 MG tablet; Take 1 Tablet by mouth 1 time a day as needed (pain). Take with food. Do not take other NSAIDs. No refills.        Assessment & Plan    Symptoms suggest a pinched nerve likely due to the piriformis muscle exerting pressure on the sciatic nerve, causing radiating pain and burning sensations. This is a less common cause of sciatica.     The physical exam did not reproduce pain  consistent with sacroiliac joint dysfunction.     A piriformis trigger point injection with numbing medicine and steroid was recommended for diagnostic and therapeutic purposes.     An MRI was ordered to further investigate the issue, to be obtained in the event that the piriformis trigger point injection does not provide significant relief, which would suggest that her issue originates in her low back.     Medications: Meloxicam was prescribed for daily use as needed, with instructions to take it with food.     Personally reviewed at today's visit:   X-ray lumbar spine 8/20/2024      Follow-up: Next available for piriformis trigger point injection. The risks, benefits, and alternatives to this procedure were discussed and the patient wishes to proceed with the procedure. Risks include but are not limited to damage to surrounding structures, infection, bleeding, worsening of pain which can be permanent, and weakness which can be permanent. Benefits include pain relief and improved function. Alternatives include not doing the procedure.        Orders Placed This Encounter    MR-LUMBAR SPINE-W/O    Referral to Pain Clinic    meloxicam (MOBIC) 15 MG tablet       Sharon Merida MD  Interventional Pain and Spine  Physical Medicine and Rehabilitation  [facility]    Sae Myers M.D.     The above note documents my personal evaluation of this patient. In addition, I have reviewed and confirmed with the patient and MA the supportive information documented in today's Patient Health Questionnaire and Office Note.     Please note that this dictation was created using voice recognition software. I have made every reasonable attempt to correct obvious errors, but I expect that there are errors of grammar and possibly content that I did not discover before finalizing the note.

## 2024-09-25 NOTE — Clinical Note
Sandro Quevedo,  Thank you for referring Fela.  Right now her symptoms seem classic for right piriformis syndrome, and I am planning to perform a right piriformis injection.  I also ordered an MRI of the lumbar spine as lumbar radiculitis is in her differential.  She can obtain this if her piriformis injection is not as effective as we hoped.  Her exam testing for sacroiliac joint dysfunction was negative today.  Thanks, Sharon Merida MD Interventional Pain and Spine Physical Medicine and Rehabilitation [facility]

## 2024-09-27 ENCOUNTER — HOSPITAL ENCOUNTER (OUTPATIENT)
Dept: LAB | Facility: MEDICAL CENTER | Age: 64
End: 2024-09-27
Attending: INTERNAL MEDICINE
Payer: COMMERCIAL

## 2024-09-27 LAB
25(OH)D3 SERPL-MCNC: 72 NG/ML (ref 30–100)
ALBUMIN SERPL BCP-MCNC: 4.4 G/DL (ref 3.2–4.9)
ALBUMIN/GLOB SERPL: 1.5 G/DL
ALP SERPL-CCNC: 78 U/L (ref 30–99)
ALT SERPL-CCNC: 14 U/L (ref 2–50)
ANION GAP SERPL CALC-SCNC: 9 MMOL/L (ref 7–16)
AST SERPL-CCNC: 22 U/L (ref 12–45)
BASOPHILS # BLD AUTO: 1 % (ref 0–1.8)
BASOPHILS # BLD: 0.05 K/UL (ref 0–0.12)
BILIRUB CONJ SERPL-MCNC: <0.2 MG/DL (ref 0.1–0.5)
BILIRUB INDIRECT SERPL-MCNC: NORMAL MG/DL (ref 0–1)
BILIRUB SERPL-MCNC: 0.4 MG/DL (ref 0.1–1.5)
BUN SERPL-MCNC: 11 MG/DL (ref 8–22)
CALCIUM ALBUM COR SERPL-MCNC: 10.8 MG/DL (ref 8.5–10.5)
CALCIUM SERPL-MCNC: 11.1 MG/DL (ref 8.5–10.5)
CHLORIDE SERPL-SCNC: 108 MMOL/L (ref 96–112)
CHOLEST SERPL-MCNC: 187 MG/DL (ref 100–199)
CO2 SERPL-SCNC: 26 MMOL/L (ref 20–33)
CREAT SERPL-MCNC: 0.82 MG/DL (ref 0.5–1.4)
CRP SERPL HS-MCNC: <0.2 MG/L (ref 0–3)
EOSINOPHIL # BLD AUTO: 0.54 K/UL (ref 0–0.51)
EOSINOPHIL NFR BLD: 10.8 % (ref 0–6.9)
ERYTHROCYTE [DISTWIDTH] IN BLOOD BY AUTOMATED COUNT: 41 FL (ref 35.9–50)
EST. AVERAGE GLUCOSE BLD GHB EST-MCNC: 108 MG/DL
FASTING STATUS PATIENT QL REPORTED: NORMAL
GFR SERPLBLD CREATININE-BSD FMLA CKD-EPI: 80 ML/MIN/1.73 M 2
GLOBULIN SER CALC-MCNC: 3 G/DL (ref 1.9–3.5)
GLUCOSE SERPL-MCNC: 86 MG/DL (ref 65–99)
HBA1C MFR BLD: 5.4 % (ref 4–5.6)
HCT VFR BLD AUTO: 45.5 % (ref 37–47)
HCYS SERPL-SCNC: 13.03 UMOL/L
HDLC SERPL-MCNC: 85 MG/DL
HGB BLD-MCNC: 15.3 G/DL (ref 12–16)
IMM GRANULOCYTES # BLD AUTO: 0.01 K/UL (ref 0–0.11)
IMM GRANULOCYTES NFR BLD AUTO: 0.2 % (ref 0–0.9)
LDLC SERPL CALC-MCNC: 88 MG/DL
LYMPHOCYTES # BLD AUTO: 1.49 K/UL (ref 1–4.8)
LYMPHOCYTES NFR BLD: 29.9 % (ref 22–41)
MCH RBC QN AUTO: 31.2 PG (ref 27–33)
MCHC RBC AUTO-ENTMCNC: 33.6 G/DL (ref 32.2–35.5)
MCV RBC AUTO: 92.7 FL (ref 81.4–97.8)
MONOCYTES # BLD AUTO: 0.37 K/UL (ref 0–0.85)
MONOCYTES NFR BLD AUTO: 7.4 % (ref 0–13.4)
NEUTROPHILS # BLD AUTO: 2.53 K/UL (ref 1.82–7.42)
NEUTROPHILS NFR BLD: 50.7 % (ref 44–72)
NRBC # BLD AUTO: 0 K/UL
NRBC BLD-RTO: 0 /100 WBC (ref 0–0.2)
PLATELET # BLD AUTO: 204 K/UL (ref 164–446)
PMV BLD AUTO: 10.7 FL (ref 9–12.9)
POTASSIUM SERPL-SCNC: 4.3 MMOL/L (ref 3.6–5.5)
PROT SERPL-MCNC: 7.4 G/DL (ref 6–8.2)
RBC # BLD AUTO: 4.91 M/UL (ref 4.2–5.4)
SODIUM SERPL-SCNC: 143 MMOL/L (ref 135–145)
T3FREE SERPL-MCNC: 3.22 PG/ML (ref 2–4.4)
T4 FREE SERPL-MCNC: 1.15 NG/DL (ref 0.93–1.7)
TRIGL SERPL-MCNC: 72 MG/DL (ref 0–149)
TSH SERPL-ACNC: 1.33 UIU/ML (ref 0.35–5.5)
WBC # BLD AUTO: 5 K/UL (ref 4.8–10.8)

## 2024-09-27 PROCEDURE — 82306 VITAMIN D 25 HYDROXY: CPT

## 2024-09-27 PROCEDURE — 84439 ASSAY OF FREE THYROXINE: CPT

## 2024-09-27 PROCEDURE — 82670 ASSAY OF TOTAL ESTRADIOL: CPT

## 2024-09-27 PROCEDURE — 86141 C-REACTIVE PROTEIN HS: CPT

## 2024-09-27 PROCEDURE — 82627 DEHYDROEPIANDROSTERONE: CPT

## 2024-09-27 PROCEDURE — 85025 COMPLETE CBC W/AUTO DIFF WBC: CPT

## 2024-09-27 PROCEDURE — 84144 ASSAY OF PROGESTERONE: CPT

## 2024-09-27 PROCEDURE — 83090 ASSAY OF HOMOCYSTEINE: CPT

## 2024-09-27 PROCEDURE — 84305 ASSAY OF SOMATOMEDIN: CPT

## 2024-09-27 PROCEDURE — 83036 HEMOGLOBIN GLYCOSYLATED A1C: CPT

## 2024-09-27 PROCEDURE — 84402 ASSAY OF FREE TESTOSTERONE: CPT

## 2024-09-27 PROCEDURE — 84482 T3 REVERSE: CPT

## 2024-09-27 PROCEDURE — 84481 FREE ASSAY (FT-3): CPT

## 2024-09-27 PROCEDURE — 84140 ASSAY OF PREGNENOLONE: CPT

## 2024-09-27 PROCEDURE — 82248 BILIRUBIN DIRECT: CPT

## 2024-09-27 PROCEDURE — 80053 COMPREHEN METABOLIC PANEL: CPT

## 2024-09-27 PROCEDURE — 36415 COLL VENOUS BLD VENIPUNCTURE: CPT

## 2024-09-27 PROCEDURE — 80061 LIPID PANEL: CPT

## 2024-09-27 PROCEDURE — 84270 ASSAY OF SEX HORMONE GLOBUL: CPT

## 2024-09-27 PROCEDURE — 84403 ASSAY OF TOTAL TESTOSTERONE: CPT

## 2024-09-27 PROCEDURE — 83525 ASSAY OF INSULIN: CPT

## 2024-09-27 PROCEDURE — 82679 ASSAY OF ESTRONE: CPT

## 2024-09-27 PROCEDURE — 84443 ASSAY THYROID STIM HORMONE: CPT

## 2024-09-28 LAB
DHEA-S SERPL-MCNC: 125 UG/DL (ref 9.4–246)
ESTRADIOL SERPL-MCNC: 10.6 PG/ML
INSULIN P FAST SERPL-ACNC: 9 UIU/ML (ref 3–25)
PROGEST SERPL-MCNC: 1.31 NG/ML

## 2024-10-02 LAB — PREG SERPL-MCNC: 180 NG/DL (ref 15–132)

## 2024-10-03 ENCOUNTER — OFFICE VISIT (OUTPATIENT)
Dept: PHYSICAL MEDICINE AND REHAB | Facility: MEDICAL CENTER | Age: 64
End: 2024-10-03
Payer: COMMERCIAL

## 2024-10-03 VITALS
HEART RATE: 82 BPM | OXYGEN SATURATION: 99 % | WEIGHT: 156.53 LBS | HEIGHT: 67 IN | DIASTOLIC BLOOD PRESSURE: 84 MMHG | SYSTOLIC BLOOD PRESSURE: 108 MMHG | BODY MASS INDEX: 24.57 KG/M2 | TEMPERATURE: 98.5 F

## 2024-10-03 DIAGNOSIS — M79.10 MYALGIA: ICD-10-CM

## 2024-10-03 DIAGNOSIS — G57.01 PIRIFORMIS SYNDROME OF RIGHT SIDE: ICD-10-CM

## 2024-10-03 DIAGNOSIS — M54.31 SCIATICA, RIGHT SIDE: ICD-10-CM

## 2024-10-03 LAB — ESTRONE SERPL-MCNC: 19.2 PG/ML

## 2024-10-03 PROCEDURE — 64445 NJX AA&/STRD SCIATIC NRV IMG: CPT | Mod: 59,RT | Performed by: STUDENT IN AN ORGANIZED HEALTH CARE EDUCATION/TRAINING PROGRAM

## 2024-10-03 PROCEDURE — 3074F SYST BP LT 130 MM HG: CPT | Performed by: STUDENT IN AN ORGANIZED HEALTH CARE EDUCATION/TRAINING PROGRAM

## 2024-10-03 PROCEDURE — 3079F DIAST BP 80-89 MM HG: CPT | Performed by: STUDENT IN AN ORGANIZED HEALTH CARE EDUCATION/TRAINING PROGRAM

## 2024-10-03 PROCEDURE — 76942 ECHO GUIDE FOR BIOPSY: CPT | Performed by: STUDENT IN AN ORGANIZED HEALTH CARE EDUCATION/TRAINING PROGRAM

## 2024-10-03 PROCEDURE — 1125F AMNT PAIN NOTED PAIN PRSNT: CPT | Performed by: STUDENT IN AN ORGANIZED HEALTH CARE EDUCATION/TRAINING PROGRAM

## 2024-10-03 PROCEDURE — 20552 NJX 1/MLT TRIGGER POINT 1/2: CPT | Performed by: STUDENT IN AN ORGANIZED HEALTH CARE EDUCATION/TRAINING PROGRAM

## 2024-10-03 RX ORDER — DEXAMETHASONE SODIUM PHOSPHATE 4 MG/ML
4 INJECTION, SOLUTION INTRA-ARTICULAR; INTRALESIONAL; INTRAMUSCULAR; INTRAVENOUS; SOFT TISSUE ONCE
Status: COMPLETED | OUTPATIENT
Start: 2024-10-03 | End: 2024-10-03

## 2024-10-03 RX ADMIN — DEXAMETHASONE SODIUM PHOSPHATE 4 MG: 4 INJECTION, SOLUTION INTRA-ARTICULAR; INTRALESIONAL; INTRAMUSCULAR; INTRAVENOUS; SOFT TISSUE at 15:16

## 2024-10-03 ASSESSMENT — PATIENT HEALTH QUESTIONNAIRE - PHQ9: CLINICAL INTERPRETATION OF PHQ2 SCORE: 0

## 2024-10-03 ASSESSMENT — FIBROSIS 4 INDEX: FIB4 SCORE: 1.84

## 2024-10-03 ASSESSMENT — PAIN SCALES - GENERAL: PAINLEVEL: 4=SLIGHT-MODERATE PAIN

## 2024-10-04 LAB
IGF-I SERPL-MCNC: 110 NG/ML (ref 36–244)
IGF-I Z-SCORE SERPL: 0
SHBG SERPL-SCNC: 104 NMOL/L (ref 17–125)
TESTOST FREE SERPL-MCNC: 1.5 PG/ML (ref 0.6–3.8)
TESTOST SERPL-MCNC: 20 NG/DL (ref 5–32)

## 2024-10-06 LAB — T3REVERSE SERPL-MCNC: 11 NG/DL (ref 9–27)

## 2024-10-23 ENCOUNTER — APPOINTMENT (OUTPATIENT)
Dept: PHYSICAL MEDICINE AND REHAB | Facility: MEDICAL CENTER | Age: 64
End: 2024-10-23
Payer: COMMERCIAL

## 2024-10-31 ENCOUNTER — OFFICE VISIT (OUTPATIENT)
Dept: PHYSICAL MEDICINE AND REHAB | Facility: MEDICAL CENTER | Age: 64
End: 2024-10-31
Payer: COMMERCIAL

## 2024-10-31 VITALS
SYSTOLIC BLOOD PRESSURE: 125 MMHG | OXYGEN SATURATION: 96 % | HEART RATE: 84 BPM | WEIGHT: 161.6 LBS | DIASTOLIC BLOOD PRESSURE: 76 MMHG | TEMPERATURE: 98.1 F | HEIGHT: 67 IN | BODY MASS INDEX: 25.36 KG/M2

## 2024-10-31 DIAGNOSIS — M79.10 MYALGIA: ICD-10-CM

## 2024-10-31 DIAGNOSIS — M54.31 SCIATICA, RIGHT SIDE: ICD-10-CM

## 2024-10-31 DIAGNOSIS — G57.01 PIRIFORMIS SYNDROME OF RIGHT SIDE: ICD-10-CM

## 2024-10-31 RX ORDER — HYDROCODONE BITARTRATE AND ACETAMINOPHEN 5; 300 MG/1; MG/1
TABLET ORAL
COMMUNITY
Start: 2024-09-17

## 2024-10-31 ASSESSMENT — PATIENT HEALTH QUESTIONNAIRE - PHQ9: CLINICAL INTERPRETATION OF PHQ2 SCORE: 0

## 2024-10-31 ASSESSMENT — FIBROSIS 4 INDEX: FIB4 SCORE: 1.84

## 2024-10-31 ASSESSMENT — PAIN SCALES - GENERAL: PAINLEVEL: 5=MODERATE PAIN

## 2024-11-26 ENCOUNTER — OFFICE VISIT (OUTPATIENT)
Dept: PHYSICAL MEDICINE AND REHAB | Facility: MEDICAL CENTER | Age: 64
End: 2024-11-26
Payer: COMMERCIAL

## 2024-11-26 VITALS
HEIGHT: 67 IN | OXYGEN SATURATION: 98 % | HEART RATE: 70 BPM | SYSTOLIC BLOOD PRESSURE: 124 MMHG | DIASTOLIC BLOOD PRESSURE: 77 MMHG | BODY MASS INDEX: 25.71 KG/M2 | TEMPERATURE: 98 F | WEIGHT: 163.8 LBS

## 2024-11-26 DIAGNOSIS — M54.31 SCIATICA, RIGHT SIDE: ICD-10-CM

## 2024-11-26 DIAGNOSIS — G57.01 PIRIFORMIS SYNDROME OF RIGHT SIDE: ICD-10-CM

## 2024-11-26 DIAGNOSIS — M79.10 MYALGIA: ICD-10-CM

## 2024-11-26 RX ORDER — MELOXICAM 15 MG/1
15 TABLET ORAL
Qty: 90 TABLET | Refills: 2 | Status: SHIPPED | OUTPATIENT
Start: 2024-11-26

## 2024-11-26 ASSESSMENT — PAIN SCALES - GENERAL: PAINLEVEL_OUTOF10: 4=SLIGHT-MODERATE PAIN

## 2024-11-26 ASSESSMENT — FIBROSIS 4 INDEX: FIB4 SCORE: 1.84

## 2024-11-26 ASSESSMENT — PATIENT HEALTH QUESTIONNAIRE - PHQ9: CLINICAL INTERPRETATION OF PHQ2 SCORE: 0

## 2024-11-27 NOTE — PROGRESS NOTES
Follow-up patient Note    Interventional Pain and Spine  Physiatry (Physical Medicine and Rehabilitation)     Patient Name: Fela Montoya  : 1960  Date of service: 2024    Chief Complaint:   Chief Complaint   Patient presents with    Follow-Up     MRI [facility]       HISTORY FROM INITIAL VISIT (2024):  History of Present Illness  The patient is a 64-year-old female who presents for a new patient visit.     She reports experiencing pain near her right sacroiliac joint, describing it as feeling like sitting on a rock all day. The pain occasionally radiates across her lower back and intensifies at times.  The pain radiates down her right leg.  She also experiences a burning sensation and tightness in the area but reports no leg weakness. Despite the pain, she maintains full mobility and can perform squats without issue. The onset of these symptoms was sudden, and she is unsure of the cause.     She has sought treatment from a chiropractor, undergone acupuncture, dry needling, and physical therapy, but none have provided relief. She has attempted self-massage of the area and uses a cane for support. She has not received any injections for this issue. She reports no numbness but occasionally experiences spasms. The pain is primarily located in the sacroiliac area and does not worsen with certain movements. She has not been diagnosed with piriformis syndrome.     She has tried hydrocodone, anti-inflammatories, and muscle relaxers for pain management, but none have been effective.          HPI  Today's visit   Fela Montoya ( 1960) is a female with Diagnoses of Sciatica, right side, Piriformis syndrome of right side, and Myalgia were pertinent to this visit.    History of Present Illness    The patient is a 64-year-old female who presents for an MRI. She is accompanied by an adult male.    She experiences pain on the right side of her tailbone, which radiates across her lower back and down her  right leg, occasionally extending slightly down her left leg. The pain can intensify, spreading up her right side. She reports no pain when arching her back backwards or twisting to either side. During severe pain episodes, she feels weak but reports no numbness.    She has not been engaging much in physical exercise and admits to not fully participating in physical therapy. She is able to perform exercises without difficulty but finds the initial stretch challenging. She has not been using a foam roller for self-massage.    She has tried meloxicam, ketorolac, and hydrocodone, with hydrocodone providing some relief. She takes half a dose of meloxicam at night before bed but has run out and requests a refill. She also takes three Advil at a time, which seems to help.    She believes the piriformis injection she received may have provided some relief. She is not currently taking hydrocodone or meloxicam.    Pain severity 4/10 currently  Pt denies new numbness, tingling, or weakness.    Procedure history:  - 10/3/24 right piriformis muscle trigger point injection  and sciatic nerve block-no significant relief         ROS:   Red Flags ROS:   Fever, Chills, Sweats: Denies  Involuntary Weight Loss: Denies  Bladder Incontinence: Denies  Bowel Incontinence: denies  Saddle Anesthesia: Denies    All other systems reviewed and negative.     PMHx:   Past Medical History:   Diagnosis Date    Anxiety        PSHx:   Past Surgical History:   Procedure Laterality Date    LIPOMA EXCISION         Family Hx:   Family History   Problem Relation Age of Onset    Heart Disease Father        Social Hx:  Social History     Socioeconomic History    Marital status:      Spouse name: Not on file    Number of children: Not on file    Years of education: Not on file    Highest education level: Not on file   Occupational History    Not on file   Tobacco Use    Smoking status: Never    Smokeless tobacco: Never   Vaping Use    Vaping status:  Never Used   Substance and Sexual Activity    Alcohol use: Yes     Comment: rare    Drug use: No    Sexual activity: Yes     Partners: Male     Birth control/protection: Post-Menopausal, Surgical     Comment:  has had a vasectomy   Other Topics Concern    Not on file   Social History Narrative    Not on file     Social Drivers of Health     Financial Resource Strain: Not on file   Food Insecurity: Not on file   Transportation Needs: Not on file   Physical Activity: Not on file   Stress: Not on file   Social Connections: Not on file   Intimate Partner Violence: Not on file   Housing Stability: Not on file       Allergies:  Allergies   Allergen Reactions    Latex Rash       Medications: reviewed on epic.   Outpatient Medications Marked as Taking for the 11/26/24 encounter (Office Visit) with Sharon Merida M.D.   Medication Sig Dispense Refill    meloxicam (MOBIC) 15 MG tablet Take 1 Tablet by mouth 1 time a day as needed (pain). Take with food. Do not take other NSAIDs 90 Tablet 2    cyclobenzaprine (FLEXERIL) 10 mg Tab Take 1 Tablet by mouth at bedtime as needed for Moderate Pain (1/2 to 1 tab at HS PRN). May cause drowsiness (do not operate heavy machinery). 20 Tablet 0    progesterone (PROMETRIUM) 100 MG Cap Take 100 mg by mouth at bedtime.      vitamin D3, cholecalciferol, 1000 UNIT Tab Take 2 Tabs by mouth every day. (Patient taking differently: Take 2,000 Units by mouth every 48 hours.) 100 Tab 3    alprazolam (XANAX) 0.25 MG Tab Take 1 Tab by mouth 3 times a day as needed for Anxiety. 30 Tab 3        Current Outpatient Medications on File Prior to Visit   Medication Sig Dispense Refill    cyclobenzaprine (FLEXERIL) 10 mg Tab Take 1 Tablet by mouth at bedtime as needed for Moderate Pain (1/2 to 1 tab at HS PRN). May cause drowsiness (do not operate heavy machinery). 20 Tablet 0    progesterone (PROMETRIUM) 100 MG Cap Take 100 mg by mouth at bedtime.      vitamin D3, cholecalciferol, 1000 UNIT Tab Take 2  "Tabs by mouth every day. (Patient taking differently: Take 2,000 Units by mouth every 48 hours.) 100 Tab 3    alprazolam (XANAX) 0.25 MG Tab Take 1 Tab by mouth 3 times a day as needed for Anxiety. 30 Tab 3    HYDROcodone-Acetaminophen 5-300 MG Tab TAKE 1/2 TABLET BY MOUTH EVERY 6 HOURS AS NEEDED      benzonatate (TESSALON) 100 MG Cap Take 1 Capsule by mouth 3 times a day as needed. 30 Capsule 0    guaiFENesin (MUCINEX PO) Take  by mouth.       No current facility-administered medications on file prior to visit.         EXAMINATION     Physical Exam:   /77 (BP Location: Right arm, Patient Position: Sitting, BP Cuff Size: Adult)   Pulse 70   Temp 36.7 °C (98 °F) (Temporal)   Ht 1.702 m (5' 7\")   Wt 74.3 kg (163 lb 12.8 oz)   SpO2 98%     Constitutional:   Body Habitus: Body mass index is 25.66 kg/m².  Cooperation: Fully cooperates with exam  Appearance: Well-groomed, well-nourished.    Eyes: No scleral icterus to suggest severe liver disease, no proptosis to suggest severe hyperthyroidism    ENT -no obvious auditory deficits, no noticeable facial droop     Skin -no rashes or lesions noted     Respiratory-  breathing comfortably on room air, no audible wheezing    Cardiovascular-distal extremities warm and well perfused.  No lower extremity edema is noted.     Gastrointestinal - no obvious abdominal masses, non-distended    Psychiatric- alert and oriented ×3. Normal affect.       Gait - normal gait, no use of ambulatory device, nonantalgic.       Musculoskeletal and Neuro -         Thoracic/Lumbar Spine/Sacral Spine/Hips   Inspection: No evidence of atrophy in bilateral lower extremities throughout      There is full active range of motion with lumbar extension     Facet loading maneuver negative bilaterally     Palpation:   Tenderness to palpation over the bilateral lumbar paraspinal muscles.  No significant tenderness to palpation over the bilateral sacroiliac joints     Lumbar spine /hip provocative exam " "maneuvers  Straight leg raise negative bilaterally  FADIR test negative bilaterally     SI joint tests  ANA test negative bilaterally      Key points for the international standards for neurological classification of spinal cord injury (ISNCSCI) to light touch.   Dermatome R L   L2 2 2   L3 2 2   L4 2 2   L5 2 2   S1 2 2   S2 2 2         Motor Exam Lower Extremities  ? Myotome R L   Hip flexion L2 5 5   Knee extension L3 5 5   Ankle dorsiflexion L4 5 5   Toe extension L5 5 5   Ankle plantarflexion S1 5 5      Previous exam  Reflexes  ?   R L   Patella   2+ 2+   Achilles    2+ 2+      Clonus of the ankle negative bilaterally       MEDICAL DECISION MAKING    Medical records review: see under HPI section.     DATA    Labs: No new labs available for review since last visit.   Lab Results   Component Value Date/Time    SODIUM 143 09/27/2024 08:17 AM    POTASSIUM 4.3 09/27/2024 08:17 AM    CHLORIDE 108 09/27/2024 08:17 AM    CO2 26 09/27/2024 08:17 AM    ANION 9.0 09/27/2024 08:17 AM    GLUCOSE 86 09/27/2024 08:17 AM    BUN 11 09/27/2024 08:17 AM    CREATININE 0.82 09/27/2024 08:17 AM    CALCIUM 11.1 (H) 09/27/2024 08:17 AM    ASTSGOT 22 09/27/2024 08:17 AM    ALTSGPT 14 09/27/2024 08:17 AM    TBILIRUBIN 0.4 09/27/2024 08:17 AM    ALBUMIN 4.4 09/27/2024 08:17 AM    TOTPROTEIN 7.4 09/27/2024 08:17 AM    GLOBULIN 3.0 09/27/2024 08:17 AM    AGRATIO 1.5 09/27/2024 08:17 AM       No results found for: \"PROTHROMBTM\", \"INR\"     Lab Results   Component Value Date/Time    WBC 5.0 09/27/2024 08:17 AM    RBC 4.91 09/27/2024 08:17 AM    HEMOGLOBIN 15.3 09/27/2024 08:17 AM    HEMATOCRIT 45.5 09/27/2024 08:17 AM    MCV 92.7 09/27/2024 08:17 AM    MCH 31.2 09/27/2024 08:17 AM    MCHC 33.6 09/27/2024 08:17 AM    MPV 10.7 09/27/2024 08:17 AM    NEUTSPOLYS 50.70 09/27/2024 08:17 AM    LYMPHOCYTES 29.90 09/27/2024 08:17 AM    MONOCYTES 7.40 09/27/2024 08:17 AM    EOSINOPHILS 10.80 (H) 09/27/2024 08:17 AM    BASOPHILS 1.00 09/27/2024 08:17 " AM        Lab Results   Component Value Date/Time    HBA1C 5.4 2024 08:17 AM        Imaging:   I personally reviewed following images, these are my reads  X-ray lumbar spine 2024  Lumbar spondylosis at bilateral lower lumbar levels.  Alignment intact.See formal radiology report for further details.        MRI lumbar spine 2024  Imaging CD provided by patient and reviewed by myself revealing disc protrusion at right L5-S1 impinging upon the S1 nerve root.  No other significant neuroforaminal stenosis no central canal stenosis seen.See formal radiology report for further details.        IMAGING radiology reads. I reviewed the following radiology reads   MRI lumbar spine 2024 report in media tab          results for orders placed in visit on 02/15/22    MR-BRAIN-W/O    Impression  1.  Unremarkable noncontrast MR examination of the brain except for a few punctate nonspecific subcortical T2 hyperintensities of no clinical significance.  2.  Mucosal thickening/fluid in the right maxillary sinus.             Results for orders placed in visit on 22    MR-CERVICAL SPINE-W/O    Impression  Mild degenerative disease in the cervical spine as described above.                                                    Results for orders placed during the hospital encounter of 24    DX-CHEST-2 VIEWS    Impression  NEGATIVE TWO VIEWS OF THE CHEST.                      Results for orders placed in visit on 24    DX-LUMBAR SPINE-2 OR 3 VIEWS    Impression  Minimal degenerative disc disease and facet hypertrophy                         Diagnosis  Visit Diagnoses     ICD-10-CM   1. Sciatica, right side  M54.31   2. Piriformis syndrome of right side  G57.01   3. Myalgia  M79.10           ASSESSMENT AND PLAN:  Fela Montoya (: 1960) is a female with      [place] was seen today for follow-up.    Diagnoses and all orders for this visit:    Sciatica, right side    Piriformis syndrome of right  side    Myalgia    Other orders  -     meloxicam (MOBIC) 15 MG tablet; Take 1 Tablet by mouth 1 time a day as needed (pain). Take with food. Do not take other NSAIDs        PLAN  S/p right piriformis trigger point injection and sciatic nerve block with slight improvement in pain    Discussed my impression that she has symptoms of lumbar radicular pain.  Discussed that this relates to her MRI finding of impingement upon the right S1 nerve root.  Discussed possible right S1 TFESI targeting this.  She would like to defer injections at this time The risks, benefits, and alternatives to this procedure were discussed. Risks include but are not limited to damage to surrounding structures, infection, bleeding, worsening of pain which can be permanent, and weakness which can be permanent. Benefits include pain relief and improved function. Alternatives include not doing the procedure.      She is advised to continue core strengthening exercises and foam rolling the glutes to alleviate the pressure on the nerve.     Medication management:  Refill meloxicam given improvement in symptoms with this.   Counseled on risk of chronic renal injury and gastric upset with long term daily use of NSAIDs and advised taking NSAIDs less than daily if possible.  -Previously discussed trial of gabapentin, she declined  -Okay to continue hydrocodone as per PCP    Follow-up: 3 months or sooner as needed.  If she would like to proceed with S1 TFESI in the future, she should contact our office and we could schedule her directly with injection if she would like    Orders Placed This Encounter    meloxicam (MOBIC) 15 MG tablet       Sharon Merida MD  Interventional Pain and Spine  Physical Medicine and Rehabilitation  [facility]      The above note documents my personal evaluation of this patient. In addition, I have reviewed and confirmed with the patient and MA the supportive information documented in today's Patient Health Questionnaire  and Office Note.     Please note that this dictation was created using voice recognition software. I have made every reasonable attempt to correct obvious errors, but I expect that there are errors of grammar and possibly content that I did not discover before finalizing the note.

## 2024-12-30 ENCOUNTER — APPOINTMENT (OUTPATIENT)
Dept: RADIOLOGY | Facility: MEDICAL CENTER | Age: 64
End: 2024-12-30
Attending: STUDENT IN AN ORGANIZED HEALTH CARE EDUCATION/TRAINING PROGRAM
Payer: COMMERCIAL

## 2025-02-26 ENCOUNTER — OFFICE VISIT (OUTPATIENT)
Dept: PHYSICAL MEDICINE AND REHAB | Facility: MEDICAL CENTER | Age: 65
End: 2025-02-26
Payer: COMMERCIAL

## 2025-02-26 VITALS
OXYGEN SATURATION: 95 % | HEART RATE: 73 BPM | HEIGHT: 67 IN | BODY MASS INDEX: 24.36 KG/M2 | SYSTOLIC BLOOD PRESSURE: 100 MMHG | DIASTOLIC BLOOD PRESSURE: 70 MMHG | WEIGHT: 155.2 LBS | TEMPERATURE: 98.1 F

## 2025-02-26 DIAGNOSIS — M79.10 MYALGIA: ICD-10-CM

## 2025-02-26 DIAGNOSIS — G57.01 PIRIFORMIS SYNDROME OF RIGHT SIDE: ICD-10-CM

## 2025-02-26 DIAGNOSIS — M54.31 SCIATICA, RIGHT SIDE: ICD-10-CM

## 2025-02-26 ASSESSMENT — PAIN SCALES - GENERAL: PAINLEVEL_OUTOF10: 3=SLIGHT PAIN

## 2025-02-26 ASSESSMENT — FIBROSIS 4 INDEX: FIB4 SCORE: 1.84

## 2025-02-26 ASSESSMENT — PATIENT HEALTH QUESTIONNAIRE - PHQ9: CLINICAL INTERPRETATION OF PHQ2 SCORE: 0

## 2025-02-26 NOTE — PROGRESS NOTES
Follow-up patient Note    Interventional Pain and Spine  Physiatry (Physical Medicine and Rehabilitation)     Patient Name: Fela Montoya  : 1960  Date of service: 2025    Chief Complaint:   Chief Complaint   Patient presents with    Follow-Up     3m med refill       HISTORY FROM INITIAL VISIT (2024):  History of Present Illness  The patient is a 64-year-old female who presents for a new patient visit.     She reports experiencing pain near her right sacroiliac joint, describing it as feeling like sitting on a rock all day. The pain occasionally radiates across her lower back and intensifies at times.  The pain radiates down her right leg.  She also experiences a burning sensation and tightness in the area but reports no leg weakness. Despite the pain, she maintains full mobility and can perform squats without issue. The onset of these symptoms was sudden, and she is unsure of the cause.     She has sought treatment from a chiropractor, undergone acupuncture, dry needling, and physical therapy, but none have provided relief. She has attempted self-massage of the area and uses a cane for support. She has not received any injections for this issue. She reports no numbness but occasionally experiences spasms. The pain is primarily located in the sacroiliac area and does not worsen with certain movements. She has not been diagnosed with piriformis syndrome.     She has tried hydrocodone, anti-inflammatories, and muscle relaxers for pain management, but none have been effective.          HPI  Today's visit   Fela Montoya ( 1960) is a female with Diagnoses of Sciatica, right side, Myalgia, and Piriformis syndrome of right side were pertinent to this visit.    Today patient presents for follow-up.  She reports that initially her condition improved which she attributes to dietary modifications, reducing her carbohydrate intake.  Unfortunately over the past few days, her symptoms have  worsened.  She notes a pulling discomfort at her right glutes.  She takes meloxicam daily and notes relief with this.  She has not engaged with myofascial release techniques to her right glute.  She reports no numbness, tingling, or leg weakness.    She would like to consider an epidural steroid injection when she has her new insurance that will be effective 3/1/2025.    Pain severity 3/10 currently  Pt denies new numbness, tingling, or weakness.    Procedure history:  - 10/3/24 right piriformis muscle trigger point injection  and sciatic nerve block-no significant relief         ROS:   Red Flags ROS:   Fever, Chills, Sweats: Denies  Involuntary Weight Loss: Denies  Bladder Incontinence: Denies  Bowel Incontinence: denies  Saddle Anesthesia: Denies    All other systems reviewed and negative.     PMHx:   Past Medical History:   Diagnosis Date    Anxiety        PSHx:   Past Surgical History:   Procedure Laterality Date    LIPOMA EXCISION         Family Hx:   Family History   Problem Relation Age of Onset    Heart Disease Father        Social Hx:  Social History     Socioeconomic History    Marital status:      Spouse name: Not on file    Number of children: Not on file    Years of education: Not on file    Highest education level: Not on file   Occupational History    Not on file   Tobacco Use    Smoking status: Never    Smokeless tobacco: Never   Vaping Use    Vaping status: Never Used   Substance and Sexual Activity    Alcohol use: Yes     Comment: rare    Drug use: No    Sexual activity: Yes     Partners: Male     Birth control/protection: Post-Menopausal, Surgical     Comment:  has had a vasectomy   Other Topics Concern    Not on file   Social History Narrative    Not on file     Social Drivers of Health     Financial Resource Strain: Not on file   Food Insecurity: Not on file   Transportation Needs: Not on file   Physical Activity: Not on file   Stress: Not on file   Social Connections: Not on file    Intimate Partner Violence: Not on file   Housing Stability: Not on file       Allergies:  Allergies   Allergen Reactions    Latex Rash       Medications: reviewed on epic.   Outpatient Medications Marked as Taking for the 2/26/25 encounter (Office Visit) with Sharon Merida M.D.   Medication Sig Dispense Refill    meloxicam (MOBIC) 15 MG tablet Take 1 Tablet by mouth 1 time a day as needed (pain). Take with food. Do not take other NSAIDs 90 Tablet 2    cyclobenzaprine (FLEXERIL) 10 mg Tab Take 1 Tablet by mouth at bedtime as needed for Moderate Pain (1/2 to 1 tab at HS PRN). May cause drowsiness (do not operate heavy machinery). 20 Tablet 0    progesterone (PROMETRIUM) 100 MG Cap Take 100 mg by mouth at bedtime.      vitamin D3, cholecalciferol, 1000 UNIT Tab Take 2 Tabs by mouth every day. (Patient taking differently: Take 2,000 Units by mouth every 48 hours.) 100 Tab 3    alprazolam (XANAX) 0.25 MG Tab Take 1 Tab by mouth 3 times a day as needed for Anxiety. 30 Tab 3        Current Outpatient Medications on File Prior to Visit   Medication Sig Dispense Refill    meloxicam (MOBIC) 15 MG tablet Take 1 Tablet by mouth 1 time a day as needed (pain). Take with food. Do not take other NSAIDs 90 Tablet 2    cyclobenzaprine (FLEXERIL) 10 mg Tab Take 1 Tablet by mouth at bedtime as needed for Moderate Pain (1/2 to 1 tab at HS PRN). May cause drowsiness (do not operate heavy machinery). 20 Tablet 0    progesterone (PROMETRIUM) 100 MG Cap Take 100 mg by mouth at bedtime.      vitamin D3, cholecalciferol, 1000 UNIT Tab Take 2 Tabs by mouth every day. (Patient taking differently: Take 2,000 Units by mouth every 48 hours.) 100 Tab 3    alprazolam (XANAX) 0.25 MG Tab Take 1 Tab by mouth 3 times a day as needed for Anxiety. 30 Tab 3    HYDROcodone-Acetaminophen 5-300 MG Tab TAKE 1/2 TABLET BY MOUTH EVERY 6 HOURS AS NEEDED      benzonatate (TESSALON) 100 MG Cap Take 1 Capsule by mouth 3 times a day as needed. 30 Capsule 0     "guaiFENesin (MUCINEX PO) Take  by mouth.       No current facility-administered medications on file prior to visit.         EXAMINATION     Physical Exam:   /70 (BP Location: Right arm, Patient Position: Sitting, BP Cuff Size: Adult)   Pulse 73   Temp 36.7 °C (98.1 °F) (Temporal)   Ht 1.702 m (5' 7\")   Wt 70.4 kg (155 lb 3.3 oz)   SpO2 95%     Constitutional:   Body Habitus: Body mass index is 24.31 kg/m².  Cooperation: Fully cooperates with exam  Appearance: Well-groomed, well-nourished.    Eyes: No scleral icterus to suggest severe liver disease, no proptosis to suggest severe hyperthyroidism    ENT -no obvious auditory deficits, no noticeable facial droop     Skin -no rashes or lesions noted     Respiratory-  breathing comfortably on room air, no audible wheezing    Cardiovascular-distal extremities warm and well perfused.  No lower extremity edema is noted.     Gastrointestinal - no obvious abdominal masses, non-distended    Psychiatric- alert and oriented ×3. Normal affect.       Gait - normal gait, no use of ambulatory device, nonantalgic.       Musculoskeletal and Neuro -         Thoracic/Lumbar Spine/Sacral Spine/Hips   Inspection: No evidence of atrophy in bilateral lower extremities throughout      There is full active range of motion with lumbar extension     Facet loading maneuver negative bilaterally     Palpation:   Tenderness to palpation over the right glute.  No significant tenderness to palpation over the bilateral sacroiliac joints     Lumbar spine /hip provocative exam maneuvers  Straight leg raise positive on right, negative on left  FADIR test negative bilaterally     SI joint tests  ANA test negative bilaterally      Key points for the international standards for neurological classification of spinal cord injury (ISNCSCI) to light touch.   Dermatome R L   L2 2 2   L3 2 2   L4 2 2   L5 2 2   S1 2 2   S2 2 2         Motor Exam Lower Extremities  ? Myotome R L   Hip flexion L2 5 5 " "  Knee extension L3 5 5   Ankle dorsiflexion L4 5 5   Toe extension L5 5 5   Ankle plantarflexion S1 5 5      Previous exam  Reflexes  ?   R L   Patella   2+ 2+   Achilles    2+ 2+      Clonus of the ankle negative bilaterally       MEDICAL DECISION MAKING    Medical records review: see under HPI section.     DATA    Labs: No new labs available for review since last visit.   Lab Results   Component Value Date/Time    SODIUM 143 09/27/2024 08:17 AM    POTASSIUM 4.3 09/27/2024 08:17 AM    CHLORIDE 108 09/27/2024 08:17 AM    CO2 26 09/27/2024 08:17 AM    ANION 9.0 09/27/2024 08:17 AM    GLUCOSE 86 09/27/2024 08:17 AM    BUN 11 09/27/2024 08:17 AM    CREATININE 0.82 09/27/2024 08:17 AM    CALCIUM 11.1 (H) 09/27/2024 08:17 AM    ASTSGOT 22 09/27/2024 08:17 AM    ALTSGPT 14 09/27/2024 08:17 AM    TBILIRUBIN 0.4 09/27/2024 08:17 AM    ALBUMIN 4.4 09/27/2024 08:17 AM    TOTPROTEIN 7.4 09/27/2024 08:17 AM    GLOBULIN 3.0 09/27/2024 08:17 AM    AGRATIO 1.5 09/27/2024 08:17 AM       No results found for: \"PROTHROMBTM\", \"INR\"     Lab Results   Component Value Date/Time    WBC 5.0 09/27/2024 08:17 AM    RBC 4.91 09/27/2024 08:17 AM    HEMOGLOBIN 15.3 09/27/2024 08:17 AM    HEMATOCRIT 45.5 09/27/2024 08:17 AM    MCV 92.7 09/27/2024 08:17 AM    MCH 31.2 09/27/2024 08:17 AM    MCHC 33.6 09/27/2024 08:17 AM    MPV 10.7 09/27/2024 08:17 AM    NEUTSPOLYS 50.70 09/27/2024 08:17 AM    LYMPHOCYTES 29.90 09/27/2024 08:17 AM    MONOCYTES 7.40 09/27/2024 08:17 AM    EOSINOPHILS 10.80 (H) 09/27/2024 08:17 AM    BASOPHILS 1.00 09/27/2024 08:17 AM        Lab Results   Component Value Date/Time    HBA1C 5.4 09/27/2024 08:17 AM        Imaging:   I personally reviewed following images, these are my reads  X-ray lumbar spine 8/20/2024  Lumbar spondylosis at bilateral lower lumbar levels.  Alignment intact.See formal radiology report for further details.        MRI lumbar spine 11/22/2024  Imaging CD provided by patient and reviewed by myself " revealing disc protrusion at right L5-S1 impinging upon the S1 nerve root.  No other significant neuroforaminal stenosis no central canal stenosis seen.See formal radiology report for further details.        IMAGING radiology reads. I reviewed the following radiology reads   MRI lumbar spine 2024 report in media tab          results for orders placed in visit on 02/15/22    MR-BRAIN-W/O    Impression  1.  Unremarkable noncontrast MR examination of the brain except for a few punctate nonspecific subcortical T2 hyperintensities of no clinical significance.  2.  Mucosal thickening/fluid in the right maxillary sinus.             Results for orders placed in visit on 22    MR-CERVICAL SPINE-W/O    Impression  Mild degenerative disease in the cervical spine as described above.                                                    Results for orders placed during the hospital encounter of 24    DX-CHEST-2 VIEWS    Impression  NEGATIVE TWO VIEWS OF THE CHEST.                      Results for orders placed in visit on 24    DX-LUMBAR SPINE-2 OR 3 VIEWS    Impression  Minimal degenerative disc disease and facet hypertrophy                         Diagnosis  Visit Diagnoses     ICD-10-CM   1. Sciatica, right side  M54.31   2. Myalgia  M79.10   3. Piriformis syndrome of right side  G57.01             ASSESSMENT AND PLAN:  Fela Montoya (: 1960) is a female with      Fela was seen today for follow-up.    Diagnoses and all orders for this visit:    Sciatica, right side    Myalgia    Piriformis syndrome of right side          PLAN  Physical Therapy: The patient has failed over 3 months of physical therapy for her current symptoms.  She continues to do her home exercise program as able. She is advised to continue core strengthening exercises and foam rolling the glutes to alleviate the pressure on the nerve.     Medications:   Okay to continue meloxicam given improvement in symptoms with this.    Counseled on risk of chronic renal injury and gastric upset with long term daily use of NSAIDs and advised taking NSAIDs less than daily if possible.  -Previously discussed trial of gabapentin, she declined  -Okay to continue hydrocodone as per PCP    Interventions:   -Discussed right S1 transforaminal epidural steroid injection.  She would like to proceed with this when her new insurance is effective after 3/1/2025.  She will contact us when she would like to schedule it. The risks, benefits, and alternatives to this procedure were discussed and the patient wishes to proceed with the procedure. Risks include but are not limited to damage to surrounding structures, infection, bleeding, worsening of pain which can be permanent, and weakness which can be permanent. Benefits include pain relief and improved function. Alternatives include not doing the procedure.    - S/p right piriformis trigger point injection and sciatic nerve block with slight improvement in pain    Follow-up: Patient to contact us when she would like to schedule right S1 TFESI.  Plan to follow-up in clinic 4 weeks after that injection    No orders of the defined types were placed in this encounter.      Sharon Merida MD  Interventional Pain and Spine  Physical Medicine and Rehabilitation  [facility]      The above note documents my personal evaluation of this patient. In addition, I have reviewed and confirmed with the patient and MA the supportive information documented in today's Patient Health Questionnaire and Office Note.     Please note that this dictation was created using voice recognition software. I have made every reasonable attempt to correct obvious errors, but I expect that there are errors of grammar and possibly content that I did not discover before finalizing the note.

## 2025-03-20 ENCOUNTER — PATIENT MESSAGE (OUTPATIENT)
Dept: HEALTH INFORMATION MANAGEMENT | Facility: OTHER | Age: 65
End: 2025-03-20

## 2025-04-08 DIAGNOSIS — M54.16 LUMBAR RADICULOPATHY: ICD-10-CM

## 2025-04-11 NOTE — Clinical Note
[facility]  [address]  [place] [address]    [id number]    Fela Montoya  [address]  [place] [address]    April 11, 2025    Member Name: Fela Montoya   Member Number: [id number]   Reference Number: [id number]   Approved Services: Outpatient Surgery   Approved Service Dates: 04/08/2025 - 04/08/2026   Requesting Provider: Sharon Merida   Requested Provider: [facility]     Dear Fela Truong Jan:    The following medical service(s) requested by Sharon Merida have been approved:    Procedure Code Procedure Code Name Requested Quantity Approved Quantity Status   01590 (CPT®) MS NJX AA&/STRD TFRML EPI LUMBAR/SACRAL 1 LEVEL 1 1 Authorized   59327 (CPT®) MS MOD SED SAME PHYS/QHP INITIAL 15 MINS 5/> YRS 1 1 Authorized       Approved Quantity means the number of visits approved for medication treatments and/or medical services.    The services should be provided by [facility] no later than 04/08/2026. Please contact the provider listed below with any questions.     Provider Information:  [facility]  [phone]    Your plan benefit may require a deductible, co-payment or coinsurance for these services. This authorization does not guarantee [facility] will pay the claim for services that you receive. Payment by [facility] for these services is subject to the terms of your Evidence of Coverage, your eligibility at the time of service, and confirmation of benefit coverage.    For any questions or additional information, please contact Customer Service:    long-term Plus Toll Free: [phone]  TTY users dial: 711   Call Center Hours:  Oct 1 - Mar 31, Mon - Fri 7 AM to 8 PM PST  Oct 1 - Mar 31, Sat - Sun 8 AM to 8 PM PST  Apr 1 - Sep 30, Mon - Fri 7 AM to 8 PM PST   Office Hours: Mon - Fri 8 AM to 5 PM PST   E-mail: [email or web address]   Website:   [email or web address]      This information is available for free in other languages. Please contact Customer Service at the phone number above for more information. [facility] complies with applicable Federal civil rights laws and does not discriminate on the basis of race, color, national origin, age, disability or sex.    Sincerely,     Healthcare Utilization Management Department     Cc: [facility]   Sharon WINKLER [place]    Multi-Language Insert  Multi- Services  English: We have free  services to answer any questions you may have about our health or drug plan.  To get an , just call us at [phone].  Someone who speaks English/Language can help you.  This is a free service.  Thai: Tenemos servicios de intérprete sin costo alguno  para responder cualquier pregunta que pueda tener sobre nuestro plan de karla o medicamentos. Para hablar con un intérprete, por favor llame al [phone]. Alguien que hable español le podrá ayudar. Julia es un servicio gratuito.  Chinese Mandarin: ?????????????????????????????? ???????????????? [phone]????????????????? ?????????  Chinese Cantonese: ?????????????????????????????? ????????????? [phone]???????????????????? ????????  Tagalog:  Mayroon kaming libreng serbitemo maxwellsaji tapiag a tootie webster hinggijose enrique duffy o panggamot.  michele Sepulveda  [phone]. Saritha Contreras.  Sergo oliveira.  Kyrgyz:  Nous proposons yara services gratuits d'interprétation pour répondre à toutes rebekah questions relatives à notre régime de santé ou d'assurance-médicaments. Pour accéder au service d'interprétation, il vous suffit de nous appeler au [phone]. Un interlocuteur parlant Françs pourra vous aider. Ce service est gratuit.  South Sudanese:  Lola  tôi có d?ch v? thông d?ch mi?n phí ð? tr? l?i các câu h?i v? chýõng s?c kh?e và chýõng trình thu?c men. N?u quí v? c?n thông d?ch viên prince g?i [phone] s? có nhân viên nói ti?ng Vi?t giúp ð? quí v?. Ðây là d?ch v? mi?n phí .  Cambodian:  Unser kosPike County Memorial Hospitalser Dolmetscherservice beantwortet Ihren Fragen zu unserem Gesundheits- und Arzneimittelplan. Unsere Dolmetscher erreichen Sie [phone]. Man wird Ihnen doug auSageWest Healthcare - Riverton. Dieser Service ist [facility].  Welsh:  ??? ?? ?? ?? ?? ??? ?? ??? ?? ???? ?? ?? ???? ???? ????. ?? ???? ????? ?? [phone] ??? ??? ????.  ???? ?? ???? ?? ?? ????. ? ???? ??? ?????.   Senegalese: Åñëè ó âàñ âîçíèêíóò âîïðîñû îòíîñèòåëüíî ñòðàõîâîãî èëè ìåäèêàìåíòíîãî ïëàíà, âû ìîæåòå âîñïîëüçîâàòüñÿ íàøèìè áåñïëàòíûìè óñëóãàìè ïåðåâîä÷èêîâ. ×òîáû âîñïîëüçîâàòüñÿ óñëóãàìè ïåðåâîä÷èêà, ïîçâîíèòå íàì ïî òåëåôîíó [phone]. Âàì îêàæåò ïîìîùü ñîòðóäíèê, êîòîðûé ãîâîðèò ïî-póññêè. Äàííàÿ óñëóãà áåñïëàòíàÿ.  Yi: ÅääÇ äÞÏã ÎÏãÇÊ ÇáãÊÑÌã ÇáÝæÑí ÇáãÌÇäíÉ ááÅÌÇÈÉ Úä Ãí ÃÓÆáÉ ÊÊÚáÞ ÈÇáÕÍÉ Ãæ ÌÏæá ÇáÃÏæíÉ áÏíäÇ. ááÍÕæá Úáì ãÊÑÌã ÝæÑí¡ áíÓ Úáíß Óæì ÇáÇÊÕÇá ÈäÇ Úáì [phone] . ÓíÞæã ÔÎÕ ãÇ íÊÍÏË ÇáÚÑÈíÉ ÈãÓÇÚÏÊß. åÐå ÎÏãÉ ãÌÇäíÉ.  Tatyana: ????? ????????? ?? ??? ?? ????? ?? ???? ??? ???? ???? ?? ?????? ?? ???? ???? ?? ??? ????? ??? ????? ???????? ?????? ?????? ???. ?? ???????? ??????? ???? ?? ???, ?? ???? [phone] ?? ??? ????. ??? ??????? ?? ?????? ????? ?? ???? ??? ?? ???? ??. ?? ?? ????? ???? ??.   Romansh:  È disponibile un servizio di interpretariato gratuito per rispondere a eventuali domande sul nostro piano sanitario e farmaceutico. Per un interprete, contattare il eric [phone]. Un nostro incaricato merlin parla Italianovi fornirà l'assistenza necessaria. È un servizio gratuito.  Portugués:  Dispomos de serviços de interpretação gratuitos para responder a qualquer questão que tenha acerca do nosso plano de saúde ou de medicação. Para obter um intérprete,  contacte-nos através do número [phone]. Irá encontrar alguém que fale o idioma  Português para o ajudar. Julia serviço é gratuito.  Ecuadorean Creole:  Nou genyen sèvis entèprèt gratis jerome reponn tout kesyon ou ta genyen konsènan plan medikal oswa dwòg nou an.  Jerome jwenn yon entèprèt, jis rele nou nan [phone]. Yon moun ki pale Kreyòl kapab domi w.  Sa a se yon sèvis ki gratis.  Polish:  Umo¿liwiamy bezp³atne skorzystanie z us³ug t³umacza ustnego, który pomo¿e w uzyskaniu odpowiedzi na temat planu zdrowotnego lub dawkowania yayaw. Veronika skorzystaæ z pomocy t³umacza znaj¹cego hunter cedeno¿y zadzwoniæ pod numer [phone]. Ta us³uga jest bezp³atna.  French: ????? ??????? ????????????????????? ??????????????????????????????????[phone] ???????????????? ? ????????????????? ?????

## 2025-05-05 ENCOUNTER — TELEPHONE (OUTPATIENT)
Dept: HEALTH INFORMATION MANAGEMENT | Facility: OTHER | Age: 65
End: 2025-05-05

## 2025-08-20 ENCOUNTER — HOSPITAL ENCOUNTER (OUTPATIENT)
Facility: MEDICAL CENTER | Age: 65
End: 2025-08-20
Attending: SURGERY
Payer: MEDICARE

## 2025-08-20 PROCEDURE — 88342 IMHCHEM/IMCYTCHM 1ST ANTB: CPT | Mod: 26 | Performed by: PATHOLOGY

## 2025-08-20 PROCEDURE — 88305 TISSUE EXAM BY PATHOLOGIST: CPT | Performed by: PATHOLOGY

## 2025-08-20 PROCEDURE — 88342 IMHCHEM/IMCYTCHM 1ST ANTB: CPT | Performed by: PATHOLOGY

## 2025-08-20 PROCEDURE — 88305 TISSUE EXAM BY PATHOLOGIST: CPT | Mod: 26 | Performed by: PATHOLOGY

## 2025-08-21 LAB — PATHOLOGY CONSULT NOTE: NORMAL
